# Patient Record
Sex: MALE | Race: OTHER | HISPANIC OR LATINO | Employment: FULL TIME | URBAN - METROPOLITAN AREA
[De-identification: names, ages, dates, MRNs, and addresses within clinical notes are randomized per-mention and may not be internally consistent; named-entity substitution may affect disease eponyms.]

---

## 2018-09-10 ENCOUNTER — TRANSCRIBE ORDERS (OUTPATIENT)
Dept: ADMINISTRATIVE | Facility: HOSPITAL | Age: 43
End: 2018-09-10

## 2018-09-10 ENCOUNTER — APPOINTMENT (OUTPATIENT)
Dept: LAB | Facility: HOSPITAL | Age: 43
End: 2018-09-10

## 2018-09-10 DIAGNOSIS — Z00.8 ENCOUNTER FOR OTHER GENERAL EXAMINATION: Primary | ICD-10-CM

## 2018-09-10 DIAGNOSIS — Z00.8 ENCOUNTER FOR OTHER GENERAL EXAMINATION: ICD-10-CM

## 2018-09-10 LAB
CHOLEST SERPL-MCNC: 207 MG/DL (ref 50–200)
EST. AVERAGE GLUCOSE BLD GHB EST-MCNC: 114 MG/DL
HBA1C MFR BLD: 5.6 % (ref 4.2–6.3)
HDLC SERPL-MCNC: 38 MG/DL (ref 40–60)
LDLC SERPL CALC-MCNC: 131 MG/DL (ref 0–100)
NONHDLC SERPL-MCNC: 169 MG/DL
TRIGL SERPL-MCNC: 192 MG/DL

## 2018-09-10 PROCEDURE — 80061 LIPID PANEL: CPT

## 2018-09-10 PROCEDURE — 83036 HEMOGLOBIN GLYCOSYLATED A1C: CPT

## 2018-09-10 PROCEDURE — 36415 COLL VENOUS BLD VENIPUNCTURE: CPT

## 2018-10-08 RX ORDER — MONTELUKAST SODIUM 10 MG/1
1 TABLET ORAL
COMMUNITY
Start: 2017-10-10 | End: 2018-12-12 | Stop reason: SDUPTHER

## 2018-10-08 RX ORDER — ALBUTEROL SULFATE 90 UG/1
AEROSOL, METERED RESPIRATORY (INHALATION)
COMMUNITY
Start: 2017-06-26 | End: 2018-12-12 | Stop reason: SDUPTHER

## 2018-10-09 ENCOUNTER — OFFICE VISIT (OUTPATIENT)
Dept: FAMILY MEDICINE CLINIC | Facility: CLINIC | Age: 43
End: 2018-10-09
Payer: COMMERCIAL

## 2018-10-09 VITALS
WEIGHT: 235 LBS | TEMPERATURE: 98 F | RESPIRATION RATE: 16 BRPM | SYSTOLIC BLOOD PRESSURE: 138 MMHG | HEART RATE: 64 BPM | BODY MASS INDEX: 31.83 KG/M2 | HEIGHT: 72 IN | DIASTOLIC BLOOD PRESSURE: 96 MMHG | OXYGEN SATURATION: 99 %

## 2018-10-09 DIAGNOSIS — R73.9 HYPERGLYCEMIA: ICD-10-CM

## 2018-10-09 DIAGNOSIS — Z00.00 PE (PHYSICAL EXAM), ANNUAL: Primary | ICD-10-CM

## 2018-10-09 DIAGNOSIS — R00.2 PALPITATIONS: ICD-10-CM

## 2018-10-09 DIAGNOSIS — E78.5 HYPERLIPIDEMIA, UNSPECIFIED HYPERLIPIDEMIA TYPE: ICD-10-CM

## 2018-10-09 DIAGNOSIS — I10 ESSENTIAL HYPERTENSION: ICD-10-CM

## 2018-10-09 PROCEDURE — 99396 PREV VISIT EST AGE 40-64: CPT | Performed by: FAMILY MEDICINE

## 2018-10-09 PROCEDURE — 93000 ELECTROCARDIOGRAM COMPLETE: CPT | Performed by: FAMILY MEDICINE

## 2018-10-09 NOTE — PATIENT INSTRUCTIONS
Wellness Visit for Adults   WHAT YOU NEED TO KNOW:   What is a wellness visit? A wellness visit is when you see your healthcare provider to get screened for health problems  You can also get advice on how to stay healthy  Write down your questions so you remember to ask them  Ask your healthcare provider how often you should have a wellness visit  What happens at a wellness visit? Your healthcare provider will ask about your health, and your family history of health problems  This includes high blood pressure, heart disease, and cancer  He or she will ask if you have symptoms that concern you, if you smoke, and about your mood  You may also be asked about your intake of medicines, supplements, food, and alcohol  Any of the following may be done:  · Your weight  will be checked  Your height may also be checked so your body mass index (BMI) can be calculated  Your BMI shows if you are at a healthy weight  · Your blood pressure  and heart rate will be checked  Your temperature may also be checked  · Blood and urine tests  may be done  Blood tests may be done to check your cholesterol levels  Abnormal cholesterol levels increase your risk for heart disease and stroke  You may also need a blood or urine test to check for diabetes if you are at increased risk  Urine tests may be done to look for signs of an infection or kidney disease  · A physical exam  includes checking your heartbeat and lungs with a stethoscope  Your healthcare provider may also check your skin to look for sun damage  · Screening tests  may be recommended  A screening test is done to check for diseases that may not cause symptoms  The screening tests you may need depend on your age, gender, family history, and lifestyle habits  For example, colorectal screening may be recommended if you are 48years old or older  What screening tests do I need if I am a woman? · A Pap smear  is used to screen for cervical cancer   Pap smears are usually done every 3 to 5 years depending on your age  You may need them more often if you have had abnormal Pap smear test results in the past  Ask your healthcare provider how often you should have a Pap smear  · A mammogram  is an x-ray of your breasts to screen for breast cancer  Experts recommend mammograms every 2 years starting at age 48 years  You may need a mammogram at age 52 years or younger if you have an increased risk for breast cancer  Talk to your healthcare provider about when you should start having mammograms and how often you need them  What vaccines might I need? · Get an influenza vaccine  every year  The influenza vaccine protects you from the flu  Several types of viruses cause the flu  The viruses change over time, so new vaccines are made each year  · Get a tetanus-diphtheria (Td) booster vaccine  every 10 years  This vaccine protects you against tetanus and diphtheria  Tetanus is a severe infection that may cause painful muscle spasms and lockjaw  Diphtheria is a severe bacterial infection that causes a thick covering in the back of your mouth and throat  · Get a human papillomavirus (HPV) vaccine  if you are female and aged 23 to 32 or male 23 to 24 and never received it  This vaccine protects you from HPV infection  HPV is the most common infection spread by sexual contact  HPV may also cause vaginal, penile, and anal cancers  · Get a pneumococcal vaccine  if you are aged 72 years or older  The pneumococcal vaccine is an injection given to protect you from pneumococcal disease  Pneumococcal disease is an infection caused by pneumococcal bacteria  The infection may cause pneumonia, meningitis, or an ear infection  · Get a shingles vaccine  if you are aged 61 or older, even if you have had shingles before  The shingles vaccine is an injection to protect you from the varicella-zoster virus  This is the same virus that causes chickenpox   Shingles is a painful rash that develops in people who had chickenpox or have been exposed to the virus  How can I eat healthy? My Plate is a model for planning healthy meals  It shows the types and amounts of foods that should go on your plate  Fruits and vegetables make up about half of your plate, and grains and protein make up the other half  A serving of dairy is included on the side of your plate  The amount of calories and serving sizes you need depends on your age, gender, weight, and height  Examples of healthy foods are listed below:  · Eat a variety of vegetables  such as dark green, red, and orange vegetables  You can also include canned vegetables low in sodium (salt) and frozen vegetables without added butter or sauces  · Eat a variety of fresh fruits , canned fruit in 100% juice, frozen fruit, and dried fruit  · Include whole grains  At least half of the grains you eat should be whole grains  Examples include whole-wheat bread, wheat pasta, brown rice, and whole-grain cereals such as oatmeal     · Eat a variety of protein foods such as seafood (fish and shellfish), lean meat, and poultry without skin (turkey and chicken)  Examples of lean meats include pork leg, shoulder, or tenderloin, and beef round, sirloin, tenderloin, and extra lean ground beef  Other protein foods include eggs and egg substitutes, beans, peas, soy products, nuts, and seeds  · Choose low-fat dairy products such as skim or 1% milk or low-fat yogurt, cheese, and cottage cheese  · Limit unhealthy fats  such as butter, hard margarine, and shortening  How much exercise do I need? Exercise at least 30 minutes per day on most days of the week  Some examples of exercise include walking, biking, dancing, and swimming  You can also fit in more physical activity by taking the stairs instead of the elevator or parking farther away from stores  Include muscle strengthening activities 2 days each week  Regular exercise provides many health benefits  It helps you manage your weight, and decreases your risk for type 2 diabetes, heart disease, stroke, and high blood pressure  Exercise can also help improve your mood  Ask your healthcare provider about the best exercise plan for you  What are some general health and safety guidelines I should follow? · Do not smoke  Nicotine and other chemicals in cigarettes and cigars can cause lung damage  Ask your healthcare provider for information if you currently smoke and need help to quit  E-cigarettes or smokeless tobacco still contain nicotine  Talk to your healthcare provider before you use these products  · Limit alcohol  A drink of alcohol is 12 ounces of beer, 5 ounces of wine, or 1½ ounces of liquor  · Lose weight, if needed  Being overweight increases your risk of certain health conditions  These include heart disease, high blood pressure, type 2 diabetes, and certain types of cancer  · Protect your skin  Do not sunbathe or use tanning beds  Use sunscreen with a SPF 15 or higher  Apply sunscreen at least 15 minutes before you go outside  Reapply sunscreen every 2 hours  Wear protective clothing, hats, and sunglasses when you are outside  · Drive safely  Always wear your seatbelt  Make sure everyone in your car wears a seatbelt  A seatbelt can save your life if you are in an accident  Do not use your cell phone when you are driving  This could distract you and cause an accident  Pull over if you need to make a call or send a text message  · Practice safe sex  Use latex condoms if are sexually active and have more than one partner  Your healthcare provider may recommend screening tests for sexually transmitted infections (STIs)  · Wear helmets, lifejackets, and protective gear  Always wear a helmet when you ride a bike or motorcycle, go skiing, or play sports that could cause a head injury  Wear protective equipment when you play sports   Wear a lifejacket when you are on a boat or doing water sports  CARE AGREEMENT:   You have the right to help plan your care  Learn about your health condition and how it may be treated  Discuss treatment options with your caregivers to decide what care you want to receive  You always have the right to refuse treatment  The above information is an  only  It is not intended as medical advice for individual conditions or treatments  Talk to your doctor, nurse or pharmacist before following any medical regimen to see if it is safe and effective for you  © 2017 [facility] Information is for End User's use only and may not be sold, redistributed or otherwise used for commercial purposes  All illustrations and images included in CareNotes® are the copyrighted property of A D A M , Inc  or Eric Saldaña

## 2018-10-09 NOTE — PROGRESS NOTES
Assessment/Plan:  1  PE (physical exam), annual  Beside his blood pressure is elevated, and obesity, patient has a number med physical exam today he already had lipid profile and hemoglobin A1c by employer and does not needto be repeated  He has deficiency of HDL, exercising will improve this number and the goal is above 40  No need for any treatment at this time  - POCT ECG  - CBC and differential; Future  - Comprehensive metabolic panel; Future  - TSH, 3rd generation with Free T4 reflex; Future            4  Palpitations  EKG is normal limits and I did find any explanation for palpitations  - TSH, 3rd generation with Free T4 reflex; Future    5  Essential hypertension  His blood pressure elevation possible is related to 1  Increase await 2  Lack of exercise 3  Excessive salt in the diet  All this three points is being addressed by patient at this time, I will follow him up in three month to ensure improvement of his blood pressure, if continue let elevated a pharmacological treatment will be offered  No problem-specific Assessment & Plan notes found for this encounter  Diagnoses and all orders for this visit:    PE (physical exam), annual  -     POCT ECG          Subjective:      Patient ID: Owen Hill is a 38 y o  male  Pt here for annual physical exam         The following portions of the patient's history were reviewed and updated as appropriate: allergies, current medications, past family history, past medical history, past social history, past surgical history and problem list     Review of Systems   Constitutional: Positive for unexpected weight change (He gain weight from last appointment six months ago and is working with exercising, biking )  Negative for activity change, appetite change, fatigue and fever  HENT: Negative for congestion, dental problem, ear pain, sinus pain and trouble swallowing  Eyes: Negative for discharge, redness and itching     Respiratory: Negative for cough, shortness of breath and wheezing  He has a history of asthma but is not complaining of any acute distress   Cardiovascular: Negative for chest pain  His blood pressure was elevated same numbers done and this office, patient understands the lifestyle modification is needed  Gastrointestinal: Negative for abdominal distention and abdominal pain  Genitourinary: Negative for difficulty urinating, dysuria and enuresis  Musculoskeletal: Negative for arthralgias, back pain and gait problem  Neurological: Negative for dizziness and headaches  Hematological: Negative for adenopathy  Does not bruise/bleed easily  Psychiatric/Behavioral: Negative for behavioral problems  Objective:      /96 (BP Location: Left arm, Patient Position: Sitting, Cuff Size: Standard)   Pulse 64   Temp 98 °F (36 7 °C) (Oral)   Resp 16   Ht 5' 11 5" (1 816 m)   Wt 107 kg (235 lb)   SpO2 99%   BMI 32 32 kg/m²          Physical Exam   Constitutional: He is oriented to person, place, and time  Obese with BMI 32   HENT:   Head: Normocephalic  Eyes: Pupils are equal, round, and reactive to light  Neck: Normal range of motion  Cardiovascular: Normal rate, regular rhythm and normal heart sounds  Pulmonary/Chest: Effort normal and breath sounds normal    Abdominal: Soft  Bowel sounds are normal    Genitourinary:   Genitourinary Comments: Patient declines genitalia and prostate exam   Musculoskeletal: Normal range of motion  Neurological: He is alert and oriented to person, place, and time  He has normal reflexes  Skin: Skin is warm and dry  Psychiatric: He has a normal mood and affect   His behavior is normal  Judgment and thought content normal

## 2018-12-08 ENCOUNTER — APPOINTMENT (OUTPATIENT)
Dept: LAB | Facility: HOSPITAL | Age: 43
End: 2018-12-08
Payer: COMMERCIAL

## 2018-12-08 ENCOUNTER — TRANSCRIBE ORDERS (OUTPATIENT)
Dept: ADMINISTRATIVE | Facility: HOSPITAL | Age: 43
End: 2018-12-08

## 2018-12-08 DIAGNOSIS — Z00.00 PE (PHYSICAL EXAM), ANNUAL: ICD-10-CM

## 2018-12-08 LAB
ALBUMIN SERPL BCP-MCNC: 4.3 G/DL (ref 3–5.2)
ALP SERPL-CCNC: 101 U/L (ref 43–122)
ALT SERPL W P-5'-P-CCNC: 48 U/L (ref 9–52)
ANION GAP SERPL CALCULATED.3IONS-SCNC: 8 MMOL/L (ref 5–14)
AST SERPL W P-5'-P-CCNC: 35 U/L (ref 17–59)
BILIRUB SERPL-MCNC: 0.8 MG/DL
BUN SERPL-MCNC: 18 MG/DL (ref 5–25)
CALCIUM SERPL-MCNC: 8.6 MG/DL (ref 8.4–10.2)
CHLORIDE SERPL-SCNC: 104 MMOL/L (ref 97–108)
CO2 SERPL-SCNC: 29 MMOL/L (ref 22–30)
CREAT SERPL-MCNC: 0.87 MG/DL (ref 0.7–1.5)
GFR SERPL CREATININE-BSD FRML MDRD: 105 ML/MIN/1.73SQ M
GLUCOSE P FAST SERPL-MCNC: 88 MG/DL (ref 70–99)
POTASSIUM SERPL-SCNC: 4 MMOL/L (ref 3.6–5)
PROT SERPL-MCNC: 8 G/DL (ref 5.9–8.4)
SODIUM SERPL-SCNC: 141 MMOL/L (ref 137–147)

## 2018-12-08 PROCEDURE — 36415 COLL VENOUS BLD VENIPUNCTURE: CPT

## 2018-12-08 PROCEDURE — 80053 COMPREHEN METABOLIC PANEL: CPT

## 2018-12-10 DIAGNOSIS — K21.9 GASTROESOPHAGEAL REFLUX DISEASE WITHOUT ESOPHAGITIS: Primary | ICD-10-CM

## 2018-12-10 RX ORDER — MONTELUKAST SODIUM 10 MG/1
10 TABLET ORAL
Qty: 90 TABLET | Refills: 3 | Status: CANCELLED | OUTPATIENT
Start: 2018-12-10

## 2018-12-12 DIAGNOSIS — J45.909 ASTHMA, UNSPECIFIED ASTHMA SEVERITY, UNSPECIFIED WHETHER COMPLICATED, UNSPECIFIED WHETHER PERSISTENT: Primary | ICD-10-CM

## 2018-12-12 RX ORDER — MONTELUKAST SODIUM 10 MG/1
10 TABLET ORAL
Qty: 30 TABLET | Refills: 5 | Status: SHIPPED | OUTPATIENT
Start: 2018-12-12 | End: 2019-02-18 | Stop reason: SDUPTHER

## 2018-12-12 RX ORDER — ALBUTEROL SULFATE 90 UG/1
2 AEROSOL, METERED RESPIRATORY (INHALATION) EVERY 4 HOURS PRN
Qty: 1 INHALER | Refills: 5 | Status: SHIPPED | OUTPATIENT
Start: 2018-12-12 | End: 2020-01-14 | Stop reason: ALTCHOICE

## 2018-12-14 RX ORDER — ESOMEPRAZOLE MAGNESIUM 40 MG/1
40 CAPSULE, DELAYED RELEASE ORAL DAILY
Qty: 90 CAPSULE | Refills: 3 | Status: SHIPPED | OUTPATIENT
Start: 2018-12-14 | End: 2020-03-19 | Stop reason: SDUPTHER

## 2019-02-05 ENCOUNTER — OFFICE VISIT (OUTPATIENT)
Dept: FAMILY MEDICINE CLINIC | Facility: CLINIC | Age: 44
End: 2019-02-05
Payer: COMMERCIAL

## 2019-02-05 VITALS
WEIGHT: 236 LBS | SYSTOLIC BLOOD PRESSURE: 150 MMHG | OXYGEN SATURATION: 98 % | HEIGHT: 72 IN | DIASTOLIC BLOOD PRESSURE: 100 MMHG | RESPIRATION RATE: 16 BRPM | TEMPERATURE: 98.1 F | BODY MASS INDEX: 31.97 KG/M2 | HEART RATE: 78 BPM

## 2019-02-05 DIAGNOSIS — Z23 NEED FOR TDAP VACCINATION: ICD-10-CM

## 2019-02-05 DIAGNOSIS — I10 ESSENTIAL HYPERTENSION: Primary | ICD-10-CM

## 2019-02-05 PROCEDURE — 3008F BODY MASS INDEX DOCD: CPT | Performed by: FAMILY MEDICINE

## 2019-02-05 PROCEDURE — 90715 TDAP VACCINE 7 YRS/> IM: CPT | Performed by: FAMILY MEDICINE

## 2019-02-05 PROCEDURE — 99214 OFFICE O/P EST MOD 30 MIN: CPT | Performed by: FAMILY MEDICINE

## 2019-02-05 PROCEDURE — 90471 IMMUNIZATION ADMIN: CPT | Performed by: FAMILY MEDICINE

## 2019-02-05 NOTE — PATIENT INSTRUCTIONS
Por favor daria con detenimiento  Muy Importante!!!    · Greer peso  será revisado  Es posible que [employer] midan greer altura para calcular greer índice de masa corporal [facility])  El [facility] indica si tiene un peso saludable  · Se verificarán greer presión arterial  y frecuencia cardíaca  También pueden revisar greer temperatura  · Exámenes de [place] [facility]  se podría realizar  Se podrían realizar exámenes de jose para revisar los niveles de [facility]  Los niveles anormales de colesterol aumentan el riesgo de enfermedad del corazón y accidente cerebrovascular  Puede que también necesite susannah prueba de jose u orina para revisar si tiene diabetes si usted está en mayor riesgo  Las pruebas de orina pueden hacerse para buscar signos de susannah infección o susannah enfermedad renal      · Un examen físico  incluye la comprobación de james latidos del corazón y los pulmones con un estetoscopio  Greer médico también podría revisarle la piel para buscar daños causados por el sol  · Pruebas de detección  podría recomendarse  Se realiza un examen de detección para detectar enfermedades que pueden no causar síntomas  Los exámenes de detección que necesite dependen de greer edad, género, antecedentes familiares y hábitos de titus  Por ejemplo, podrían recomendarle la exploración selectiva colorrectal si tiene 48 años o más  ¿Qué exámenes de detección necesito si soy susannah mago? · El examen de Papanicolaou  se utiliza para detectar cáncer de zoraida uterino  El examen del Papanicolaou por lo general se realiza entre 3 a 5 años dependiendo de greer edad  Puede necesitarlo más a menudo si usted ha tenido [employer] de la prueba de Papanicolaou en el pasado  Pregunte a greer médico con qué frecuencia debería realizarse un examen de Papanicolaou  · Susannah mamografía  es susannah radiografía de james senos para detectar cáncer de mama  Los expertos recomiendan [address] cada 2 años empezando a los 48 años de [place]   Es probable que usted necesite susannah mamografía a los 52 años o antes si tiene riesgo alto de cáncer de seno  Hable con greer médico sobre cuándo debe empezar con james mamografías y con cuánta frecuencia las necesita  ¿Qué vacunas podría necesitar? · Debe recibir [place] vacuna contra la influenza  todos los [place] cr[place]  La vacuna contra la influenza protege de la gripe  Varios tipos de virus causan la influenza  Debido a que los virus [place] con el [place], es necesaria la producción de nuevas vacunas cada año  · Debe recibir [place] vacuna de refuerzo contra el tétanos-difteria (Td)  cada 10 años  Esta vacuna protege contra el tétanos y la difteria  El tétanos es susannah infección severa que puede causar trismo y espasmos musculares dolorosos  La difteria es susannah infección bacterial grave que produce susannah cubierta gruesa en la parte de atrás de la boca y garganta  · Debe recibir la vacuna contra el virus del papiloma humano (VPH)  si usted es mago y De Lancey 19 y 32 años o es hombre y De Lancey 23 y 24 años y nunca la recibió  Esta vacuna protege contra la infección por VPH  El virus del papiloma humano o VPH es la infección más común que se transmite por contacto sexual  El virus del papiloma humano también podría provocar cáncer vaginal, del pene y del ano  · Debe recibir la vacuna antineumocócica  si tiene más de 72 años  La vacuna antineumocócica es susannah inyección que se administra para protegerlo contra susannah enfermedad neumocócica  La enfermedad neumocócica es susannah infección causada por la bacteria neumocócica  La infección puede causar neumonía, meningitis o susannah infección del oído  · Debe recibir la vacuna contra la culebrilla  si tiene [address] [place] [address],[address] [place] [address] o [place], incluso si mattson tenido culebrilla antes  La vacuna contra la culebrilla (herpes zóster) es susannah inyección usada para proteger contra el virus zóster que causa la varicela  [place] es el mismo virus que causa la varicela   La culebrilla es un sarpullido doloroso que se desarrolla en personas que tuvieron varicela o mattson estado expuestas al virus  ¿Cómo puedo comer de manera saludable? Mi [place] es un modelo para planear comidas sanas  Muestra los tipos y cantidades de alimentos que deberían ir en un plato  Donn Rubinstein y verduras representan alrededor de la mitad de greer plato y los granos y proteínas representan la otra mitad  Se incluye susannah porción de productos lácteos al lado del plato  La cantidad de calorías y [address] de las porciones que usted necesita dependen de greer edad, [place], peso y altura  Los ejemplos de alimentos saludables son:  · Consuma susannah variedad de verduras  bridget las de color cora oscuro, jamil y [place]  Usted también puede incluir verduras enlatadas bajas en sodio (sal) y verduras congeladas sin mantequilla ni salsas [id number]  · Consuma susannah variedad de fruta frescas , las frutas enlatadas en 100% de jugo , fruta [place] y gloria secos  · Incluya granos enteros  Por lo menos la mitad de los granos que usted consume deben ser granos de michael integral  Por ejemplo, panes de grano entero, pasta integral, arroz integral y cereales de grano entero bridget la kolton  · Consuma susannah variedad de alimentos con proteínas bridget mariscos (pescado y crustáceos), Johns Libra y carne de ave sin piel (pavo y pati)  Ejemplos de dash magras incluyen pierna, paleta o lomo de puerco y sree, solomillo o, lomo de res y carne [place] de res extra [place]  Otros alimentos ricos en proteínas son los huevos y sustitutos de [place], frijoles, chícharos, productos de soya, nueces y semillas  · Elija productos lácteos bajos en grasa [employer] o del 1% o yogur, queso y requesón bajos en grasa  · Limite las grasas poco saludables,  bridget la [place], la margarina dura y la [place]  ¿Qué cantidad de actividad física necesito? Realice susannah actividad física por lo menos 30 minutos al día, la mayoría de los días de la [place]   Algunos de los ejercicios incluyen caminar, montar en bicicleta, bailar y nadar  Usted también puede realizar más actividad física usando las escaleras en vez de los ascensores o estacionarse más lejos cuando Bobie Bolus a las tiendas  Incluya ejercicios para fortalecer los músculos 2 días a la semana  El ejercicio regular proporciona muchos beneficios para la nicole  Olivia Vickers a controlar greer peso y [employer] riesgo de diabetes tipo 2, presión arterial ирина, enfermedad del corazón y accidente cerebrovascular  El ejercicio [employer] puede ayudar a mejorar greer estado de ánimo  Pregunte a greer médico acerca del mejor plan de ejercicio para usted  ¿Cuáles son Celine Brooking generales de nicole y seguridad que alice seguir? · No fume  La nicotina y otras sustancias químicas que contienen los cigarrillos y cigarros pueden dañar los pulmones  Pida información a greer médico si usted actualmente fuma y necesita ayuda para dejar de fumar  Los cigarrillos electrónicos o tabaco sin humo todavía contienen nicotina  Consulte con greer médico antes de [employer]  · Limite el consumo de alcohol  Un trago equivale a 12 onzas de cerveza, 5 onzas de vino o 1 onza y ½ de licor  · Baje de peso, si es necesario  El sobrepeso aumenta el riesgo de ciertas condiciones de [place]  Estos incluyen enfermedad del corazón, presión arterial ирина, diabetes tipo 2 y ciertos tipos de cáncer  · Proteja greer piel  No tome el sol ni use hi de bronceado  Use protector solar con un SPF 13 o mayor  Aplíquese el bloqueador por lo menos 15 minutos antes de que vaya a estar al [employer]  Vuelva a aplicarse la crema solar cada 2 horas  Use ropa protectora, sombrero y lentes para el sol cuando se encuentre afuera  · Conduzca con seguridad  Use siempre greer cinturón de seguridad  Asegúrese que todos en el nicole usan el cinturón de seguridad  Un cinturón de seguridad puede salvar greer titus en latonia de un accidente automovilístico  No use el celular cuando esté manejando   [place] puede hacer que se distraiga y causar un accidente  Es mejor que pare y se orille si necesita hacer susannah Lita Banco un texto  · Practique el sexo seguro  Use condones de látex si es sexualmente [place] y tiene más de [place]  León médico puede recomendar exámenes de detección de infecciones de transmisión sexual (ITS)  · Use un inez, un chaleco salvavidas y unos implementos de protección  Siempre use un inez al [employer] en bicicleta o motocicleta, esquiar o jugar deportes que podrían causar susannah lesión en la jeni  Use implementos de protección cuando practique deportes  Use un chaleco salvavidas cuando esté en un bote o practicando actividades acuáticas

## 2019-02-05 NOTE — PROGRESS NOTES
Assessment/Plan:  1  Need for Tdap vaccination     - TDAP VACCINE GREATER THAN OR EQUAL TO 6YO IM    2  Essential hypertension   A 1-month trial is recommended in compliant patients willing to make therapeutic lifestyle changes, prior to determining that pharmacologic therapy is necessary  Most patients will require drug therapy to achieve target BP control  Again  patient got  explanation of the risks associated with HTN and the need for adequate control and adherence to therapy  Initial therapeutic measure is  lifelong lifestyle modification including:   Sodium reduction (<2 g/day)   Dietary Approaches to Stop Hypertension (DASH) diet (3 servings of fruit and vegetables daily, whole grains, low sodium, low-fat proteins)    Weight loss to BMI under 30 kg/m^2   Increased physical activity: 3 to 5 times/week of daily aerobic exercise for 30- to 50-minute sessions as tolerated    Limited alcohol consumption less than 5 drinks for week  No problem-specific Assessment & Plan notes found for this encounter  Diagnoses and all orders for this visit:    Need for Tdap vaccination  -     TDAP VACCINE GREATER THAN OR EQUAL TO 6YO IM          Subjective:      Patient ID: Adriane Wilkins is a 37 y o  male  HPI    The following portions of the patient's history were reviewed and updated as appropriate: allergies, current medications, past family history, past medical history, past social history, past surgical history and problem list     Review of Systems   Constitutional: Negative for activity change, appetite change, fatigue and fever  HENT: Negative for congestion, dental problem, ear pain, sinus pain and trouble swallowing  Eyes: Negative for discharge, redness and itching  Respiratory: Negative for cough, shortness of breath and wheezing  Cardiovascular: Negative for chest pain  Gastrointestinal: Negative for abdominal distention and abdominal pain     Genitourinary: Negative for difficulty urinating, dysuria and enuresis  Musculoskeletal: Negative for arthralgias, back pain and gait problem  Neurological: Negative for dizziness and headaches  Hematological: Negative for adenopathy  Does not bruise/bleed easily  Psychiatric/Behavioral: Negative for behavioral problems  Objective:      /100 (BP Location: Left arm, Patient Position: Sitting, Cuff Size: Standard)   Pulse 78   Temp 98 1 °F (36 7 °C) (Oral)   Resp 16   Ht 5' 11 5" (1 816 m)   Wt 107 kg (236 lb)   SpO2 98%   BMI 32 46 kg/m²          Physical Exam   Constitutional: He is oriented to person, place, and time  He appears well-developed  HENT:   Head: Normocephalic  Eyes: Pupils are equal, round, and reactive to light  Neck: Normal range of motion  Cardiovascular: Normal rate  Pulmonary/Chest: Effort normal and breath sounds normal    Abdominal: Soft  Genitourinary: Penis normal    Musculoskeletal: Normal range of motion  Neurological: He is alert and oriented to person, place, and time  Skin: Skin is warm and dry  Psychiatric: He has a normal mood and affect   His behavior is normal  Judgment and thought content normal

## 2019-02-18 DIAGNOSIS — J45.909 ASTHMA, UNSPECIFIED ASTHMA SEVERITY, UNSPECIFIED WHETHER COMPLICATED, UNSPECIFIED WHETHER PERSISTENT: ICD-10-CM

## 2019-02-18 RX ORDER — MONTELUKAST SODIUM 10 MG/1
10 TABLET ORAL
Qty: 90 TABLET | Refills: 3 | Status: SHIPPED | OUTPATIENT
Start: 2019-02-18 | End: 2020-03-19 | Stop reason: SDUPTHER

## 2019-03-28 ENCOUNTER — OFFICE VISIT (OUTPATIENT)
Dept: FAMILY MEDICINE CLINIC | Facility: CLINIC | Age: 44
End: 2019-03-28
Payer: COMMERCIAL

## 2019-03-28 VITALS
HEIGHT: 73 IN | BODY MASS INDEX: 31.17 KG/M2 | OXYGEN SATURATION: 98 % | RESPIRATION RATE: 20 BRPM | SYSTOLIC BLOOD PRESSURE: 130 MMHG | DIASTOLIC BLOOD PRESSURE: 90 MMHG | HEART RATE: 79 BPM | TEMPERATURE: 97.1 F | WEIGHT: 235.2 LBS

## 2019-03-28 DIAGNOSIS — I10 ESSENTIAL HYPERTENSION: Primary | ICD-10-CM

## 2019-03-28 DIAGNOSIS — E66.9 OBESITY (BMI 30.0-34.9): ICD-10-CM

## 2019-03-28 PROBLEM — E66.811 OBESITY (BMI 30.0-34.9): Status: ACTIVE | Noted: 2019-03-28

## 2019-03-28 PROCEDURE — 99213 OFFICE O/P EST LOW 20 MIN: CPT | Performed by: FAMILY MEDICINE

## 2019-03-28 PROCEDURE — 3008F BODY MASS INDEX DOCD: CPT | Performed by: FAMILY MEDICINE

## 2019-03-28 PROCEDURE — 1036F TOBACCO NON-USER: CPT | Performed by: FAMILY MEDICINE

## 2019-03-28 NOTE — ASSESSMENT & PLAN NOTE
Decreasing carbs in the diet and exercising more intense will help him to get on the target weight  For the next three months the goal is decrease the 5% of his body weight, which is approximately 12 lb

## 2019-03-28 NOTE — PROGRESS NOTES
Assessment/Plan:    Essential hypertension  He initiated lifestyle modification, decreasing the salt in diet  He also exercise and check his steps frequently  We will need medications at this time  We need to continue doing same but increase exercise to a moderate intense to have a target heart rate 60% for age  To reduce 12 lb of his weight for the next appointment     Recheck in three month  Obesity (BMI 30 0-34  9)  Decreasing carbs in the diet and exercising more intense will help him to get on the target weight  For the next three months the goal is decrease the 5% of his body weight, which is approximately 12 lb  Diagnoses and all orders for this visit:    Essential hypertension    Obesity (BMI 30 0-34  9)          Subjective:      Patient ID: Jose Luis Snow is a 37 y o  male  Patient is here to follow HTN, patient states good compliance with treatment  Denies chest pain, shortness of breath, angina, urinary problems  No exercise but follows low salt diet  The following portions of the patient's history were reviewed and updated as appropriate: allergies, current medications, past family history, past medical history, past social history, past surgical history and problem list     Review of Systems   Constitutional: Negative for activity change, appetite change, fatigue and fever  HENT: Negative for congestion, dental problem, ear pain, sinus pain and trouble swallowing  Eyes: Negative for discharge, redness and itching  Respiratory: Negative for cough, shortness of breath and wheezing  Cardiovascular: Negative for chest pain  Gastrointestinal: Negative for abdominal distention and abdominal pain  Genitourinary: Negative for difficulty urinating, dysuria and enuresis  Musculoskeletal: Negative for arthralgias, back pain and gait problem  Neurological: Negative for dizziness and headaches  Hematological: Negative for adenopathy  Does not bruise/bleed easily  Psychiatric/Behavioral: Negative for behavioral problems  Objective:      /90   Pulse 79   Temp (!) 97 1 °F (36 2 °C) (Temporal)   Resp 20   Ht 6' 1" (1 854 m)   Wt 107 kg (235 lb 3 2 oz)   SpO2 98%   BMI 31 03 kg/m²          Physical Exam   Constitutional: He is oriented to person, place, and time  He appears well-developed  HENT:   Head: Normocephalic  Eyes: Pupils are equal, round, and reactive to light  Neck: Normal range of motion  Cardiovascular: Normal rate  Pulmonary/Chest: Effort normal and breath sounds normal    Abdominal: Soft  Genitourinary: Penis normal    Musculoskeletal: Normal range of motion  Neurological: He is alert and oriented to person, place, and time  Skin: Skin is warm and dry  Psychiatric: He has a normal mood and affect  His behavior is normal  Judgment and thought content normal    Nursing note and vitals reviewed  BMI Counseling: Body mass index is 31 03 kg/m²  Discussed the patient's BMI with him  The BMI is above average  BMI counseling and education was provided to the patient  Exercise recommendations include exercising 3-5 times per week

## 2019-03-28 NOTE — PATIENT INSTRUCTIONS
Core Strengthening Exercises   AMBULATORY CARE:   What you need to know about core strengthening exercises: Your core includes the muscles of your lower back, hip, pelvis, and abdomen  Core strengthening exercises help heal and strengthen these muscles  This helps prevent another injury, and keeps your pelvis, spine, and hips in the correct position  Contact your healthcare provider if:   · You have sharp or worsening pain during exercise or at rest     · You have questions or concerns about your shoulder exercises  Safety tips:  Talk to your healthcare provider before you start an exercise program  A physical therapist can teach you how to do core strengthening exercises safely  · Do the exercises on a mat or firm surface  A firm surface will support your spine and avoid low back pain  Do not do these exercises on a bed  · Move slowly and smoothly  Avoid fast or jerky motions  · Stop if you feel pain  Core exercises should not feel painful  Stop if you feel pain  · Breathe normally during core exercises  Do not hold your breath  This may cause an increase in blood pressure and prevent muscle strengthening  Your healthcare provider will tell you when to inhale and exhale during the exercise  · Begin all of your exercises with abdominal bracing  Abdominal bracing helps warm up your core muscles  You can also practice abdominal bracing throughout the day while you are sitting or standing  Lie on your back with your knees bent and feet flat on the floor  Place your arms in a relaxed position beside your body  Tighten your abdominal muscles  Pull your belly button in and up toward your spine  Hold for 5 seconds  Relax your muscles  Repeat 10 times  Core strengthening exercises: Your healthcare provider will tell you how often to do these exercises  The provider will also tell you how many repetitions of each exercise you should do  Hold each exercise for 5 seconds or as directed   As you get stronger, increase your hold to 10 to 15 seconds  You can do some of these exercises on a stability ball, or with a weight  Ask your healthcare provider how to use a stability ball or weight for these exercises:  · Bent leg side bridge:  Lie on one side with your legs, hips, and shoulders in a straight line  Prop yourself up onto your forearm so your elbow is directly below your shoulder  Bend your knees to 90°  Lift your hips off the floor  Balance yourself on your forearm and the side of your knee  Hold this position  Repeat on the other side  · Straight leg side bridge:  Lie on one side with your legs, hips, and shoulders in a straight line  Prop yourself up onto your forearm so your elbow is directly below your shoulder  Your top leg can rest in front of your bottom leg for support  Lift your hips off the floor  Balance yourself on your forearm and the outside of your flexed foot  Do not let your ankle bend sideways  Hold this position  Repeat on the other side  · Superman:  Lie on your stomach  Extend your arms forward on the floor  Tighten your abdominal muscles and lift your right hand and left leg off the floor  Hold this position  Slowly return to the starting position  Tighten your abdominal muscles and lift your left hand and right leg off the floor  Hold this position  Slowly return to the starting position  · Curl up:  Lie on your back with your knees bent and feet flat on the floor  Place your hands, palms down, underneath your lower back  Next, with your elbows on the floor, lift your shoulders and chest 2 to 3 inches off the floor  Keep your head in line with your shoulders  Hold this position  Slowly return to the starting position  · Straight leg raises:  Lie on your back with one leg straight  Bend the other knee and place your foot flat on the floor  Tighten your abdominal muscles   Keep your leg straight and slowly lift it straight up 6 to 12 inches off the floor  Hold this position  Lower your leg slowly  Do as many repetitions as directed on this side  Repeat with the other leg  · Plank:  Lie on your stomach  Bend your elbows and place your forearms flat on the floor  Lift your chest, stomach, and knees off the floor  Make sure your elbows are below your shoulders  Your body should be in a straight line  Do not let your hips or lower back sink to the ground  Squeeze your abdominal muscles together and hold for 15 seconds  To make this exercise harder, hold for 30 seconds or lift 1 leg at a time  · Bicycles:  Lie on your back  Bend both knees and bring them toward your chest  Your calves should be parallel to the floor  Place the palms of your hands on the back of your head  Straighten your right leg and keep it lifted 2 inches off the floor  Raise your head and shoulders off the floor and twist towards your left  Keep your head and shoulders lifted  Bend your right knee while you straighten your left leg  Keep your left leg 2 inches off the floor  Twist your head and chest towards the left leg  Continue to straighten 1 leg at a time and twist        Follow up with your healthcare provider as directed:  Write down your questions so you remember to ask them during your visits  © 2017 [employer] Information is for End User's use only and may not be sold, redistributed or otherwise used for commercial purposes  All illustrations and images included in CareNotes® are the copyrighted property of [employer]  or Eric Saldaña  The above information is an  only  It is not intended as medical advice for individual conditions or treatments  Talk to your doctor, nurse or pharmacist before following any medical regimen to see if it is safe and effective for you

## 2019-03-28 NOTE — ASSESSMENT & PLAN NOTE
He initiated lifestyle modification, decreasing the salt in diet  He also exercise and check his steps frequently  We will need medications at this time  We need to continue doing same but increase exercise to a moderate intense to have a target heart rate 60% for age  To reduce 12 lb of his weight for the next appointment     Recheck in three month

## 2019-07-02 ENCOUNTER — OFFICE VISIT (OUTPATIENT)
Dept: FAMILY MEDICINE CLINIC | Facility: CLINIC | Age: 44
End: 2019-07-02
Payer: COMMERCIAL

## 2019-07-02 VITALS
RESPIRATION RATE: 16 BRPM | DIASTOLIC BLOOD PRESSURE: 80 MMHG | HEART RATE: 78 BPM | TEMPERATURE: 98 F | BODY MASS INDEX: 31.02 KG/M2 | HEIGHT: 72 IN | OXYGEN SATURATION: 98 % | SYSTOLIC BLOOD PRESSURE: 130 MMHG | WEIGHT: 229 LBS

## 2019-07-02 DIAGNOSIS — R73.9 HYPERGLYCEMIA: ICD-10-CM

## 2019-07-02 DIAGNOSIS — I10 ESSENTIAL HYPERTENSION: Primary | ICD-10-CM

## 2019-07-02 PROCEDURE — 1036F TOBACCO NON-USER: CPT | Performed by: FAMILY MEDICINE

## 2019-07-02 PROCEDURE — 3008F BODY MASS INDEX DOCD: CPT | Performed by: FAMILY MEDICINE

## 2019-07-02 PROCEDURE — 99213 OFFICE O/P EST LOW 20 MIN: CPT | Performed by: FAMILY MEDICINE

## 2019-07-02 NOTE — PROGRESS NOTES
Assessment/Plan:    Essential hypertension  Patient will continue with previous regimen  I explained to patient the risks associated with HTN and the need for adequate control and adherence to therapy  The continue therapeutic measure is  lifelong lifestyle modification including:   Sodium reduction (<2 g/day)   Dietary Approaches to Stop Hypertension (DASH) diet (3 servings of fruit and vegetables daily, whole grains, low sodium, low-fat proteins)    Weight loss to BMI under 30 kg/m^2   Increased physical activity: 3 to 5 times/week of daily aerobic exercise for 30- to 50-minute sessions as tolerated    Avoid alcohol consumption  Smoking assessment  was done  Patient is a  non-smoker  Diagnoses and all orders for this visit:    Essential hypertension  -     Comprehensive metabolic panel; Future  -     Lipid panel; Future    Hyperglycemia  -     Hemoglobin A1C; Future        BMI Counseling: Body mass index is 31 49 kg/m²  Discussed the patient's BMI with him  The BMI is above average  BMI counseling and education was provided to the patient  Exercise recommendations include exercising 3-5 times per week  Subjective:      Patient ID: Dalton Sauer is a 37 y o  male  HPI    The following portions of the patient's history were reviewed and updated as appropriate: allergies, current medications, past family history, past medical history, past social history, past surgical history and problem list     Review of Systems   Constitutional: Negative for chills, diaphoresis, fatigue and fever  HENT: Negative for hearing loss, sinus pressure, sore throat and trouble swallowing  Eyes: Negative for photophobia, pain, redness and visual disturbance  Respiratory: Negative for cough, choking, chest tightness and shortness of breath  Cardiovascular: Negative for chest pain, palpitations and leg swelling  Gastrointestinal: Negative for abdominal pain     Genitourinary: Negative for difficulty urinating, dysuria, enuresis and flank pain  Musculoskeletal: Negative for arthralgias, back pain, gait problem and joint swelling  Neurological: Negative for dizziness, facial asymmetry, light-headedness and headaches  Psychiatric/Behavioral: Negative for agitation, behavioral problems, confusion and decreased concentration  Objective:      /80 (BP Location: Left arm, Patient Position: Sitting, Cuff Size: Standard)   Pulse 78   Temp 98 °F (36 7 °C) (Oral)   Resp 16   Ht 5' 11 5" (1 816 m)   Wt 104 kg (229 lb)   SpO2 98%   BMI 31 49 kg/m²          Physical Exam   Constitutional: No distress  HENT:   Nose: Nose normal    Mouth/Throat: Oropharynx is clear and moist    Eyes: Pupils are equal, round, and reactive to light  Conjunctivae are normal    Neck: Normal range of motion  No thyromegaly present  Cardiovascular: Normal rate, regular rhythm and normal heart sounds  Exam reveals no friction rub  No murmur heard  Pulmonary/Chest: Effort normal and breath sounds normal  No stridor  No respiratory distress  Musculoskeletal: He exhibits no edema, tenderness or deformity  Neurological: He displays normal reflexes  No cranial nerve deficit  He exhibits normal muscle tone  Coordination normal    Skin: He is not diaphoretic

## 2019-07-02 NOTE — ASSESSMENT & PLAN NOTE
Patient will continue with previous regimen  I explained to patient the risks associated with HTN and the need for adequate control and adherence to therapy  The continue therapeutic measure is  lifelong lifestyle modification including:   Sodium reduction (<2 g/day)   Dietary Approaches to Stop Hypertension (DASH) diet (3 servings of fruit and vegetables daily, whole grains, low sodium, low-fat proteins)    Weight loss to BMI under 30 kg/m^2   Increased physical activity: 3 to 5 times/week of daily aerobic exercise for 30- to 50-minute sessions as tolerated    Avoid alcohol consumption  Smoking assessment  was done  Patient is a  non-smoker

## 2019-11-20 ENCOUNTER — OFFICE VISIT (OUTPATIENT)
Dept: FAMILY MEDICINE CLINIC | Facility: CLINIC | Age: 44
End: 2019-11-20
Payer: COMMERCIAL

## 2019-11-20 VITALS
WEIGHT: 229 LBS | SYSTOLIC BLOOD PRESSURE: 130 MMHG | RESPIRATION RATE: 16 BRPM | HEIGHT: 72 IN | TEMPERATURE: 98 F | OXYGEN SATURATION: 97 % | BODY MASS INDEX: 31.02 KG/M2 | HEART RATE: 84 BPM | DIASTOLIC BLOOD PRESSURE: 86 MMHG

## 2019-11-20 DIAGNOSIS — E66.9 OBESITY (BMI 30.0-34.9): ICD-10-CM

## 2019-11-20 DIAGNOSIS — I10 ESSENTIAL HYPERTENSION: ICD-10-CM

## 2019-11-20 DIAGNOSIS — B35.3 TINEA PEDIS OF RIGHT FOOT: ICD-10-CM

## 2019-11-20 DIAGNOSIS — Z00.01 ENCOUNTER FOR GENERAL ADULT MEDICAL EXAMINATION WITH ABNORMAL FINDINGS: ICD-10-CM

## 2019-11-20 DIAGNOSIS — Z23 NEED FOR PNEUMOCOCCAL VACCINE: Primary | ICD-10-CM

## 2019-11-20 DIAGNOSIS — Z11.4 SCREENING FOR HUMAN IMMUNODEFICIENCY VIRUS: ICD-10-CM

## 2019-11-20 PROCEDURE — 99396 PREV VISIT EST AGE 40-64: CPT | Performed by: FAMILY MEDICINE

## 2019-11-20 PROCEDURE — 90471 IMMUNIZATION ADMIN: CPT | Performed by: FAMILY MEDICINE

## 2019-11-20 PROCEDURE — 90732 PPSV23 VACC 2 YRS+ SUBQ/IM: CPT | Performed by: FAMILY MEDICINE

## 2019-11-20 RX ORDER — CLOTRIMAZOLE AND BETAMETHASONE DIPROPIONATE 10; .64 MG/G; MG/G
CREAM TOPICAL
Qty: 30 G | Refills: 1 | Status: SHIPPED | OUTPATIENT
Start: 2019-11-20 | End: 2020-01-09 | Stop reason: ALTCHOICE

## 2019-11-20 NOTE — ASSESSMENT & PLAN NOTE
During this visit we have a goal to personalize prevention  I discussed the patient about Excercise, the need for a life style plan and decrease the impact of current problems  Health risk assessment was discussed with patient also and the ways to stay healthier  We reviewed also the current medications, the need to avoid polypharmacy in his current treatment; also about how the chronic conditions are impacting now and later  Recommended a healthy diet and exercising frequently will help to control better patient's current chronic conditions;  Immunizations, and the need to compliance with current CDC's recommendations  Patient declined at this time advanced directives

## 2019-11-20 NOTE — PROGRESS NOTES
Subjective:   Brennan Villarreal is a 37 y o  male with hypertension  Current Outpatient Medications   Medication Sig Dispense Refill    albuterol (VENTOLIN HFA) 90 mcg/act inhaler Inhale 2 puffs every 4 (four) hours as needed for wheezing 1 Inhaler 5    esomeprazole (NexIUM) 40 MG capsule Take 1 capsule (40 mg total) by mouth daily 90 capsule 3    montelukast (SINGULAIR) 10 mg tablet Take 1 tablet (10 mg total) by mouth daily at bedtime 90 tablet 3    clotrimazole-betamethasone (LOTRISONE) 1-0 05 % cream Apply topical over affected areas twice a day 30 g 1     No current facility-administered medications for this visit  HPI: for PE  HTN non complaints  Hypertension ROS: no medication side effects noted, home BP monitoring in range of 275'M systolic over 41'T diastolic, no TIA's, no chest pain on exertion, no dyspnea on exertion, New York Heart Association (NYHA) class I, no swelling of ankles and no palpitations  New concerns: none  Objective:   /86 (BP Location: Left arm, Patient Position: Sitting, Cuff Size: Standard)   Pulse 84   Temp 98 °F (36 7 °C) (Oral)   Resp 16   Ht 5' 11 5" (1 816 m)   Wt 104 kg (229 lb)   SpO2 97%   BMI 31 49 kg/m²     /86 (BP Location: Left arm, Patient Position: Sitting, Cuff Size: Standard)   Pulse 84   Temp 98 °F (36 7 °C) (Oral)   Resp 16   Ht 5' 11 5" (1 816 m)   Wt 104 kg (229 lb)   SpO2 97%   BMI 31 49 kg/m²    Appearance alert, well appearing, and in no distress and overweight  General exam BP noted to be well controlled today in office, BP noted to be borderline elevated today in office, S1, S2 normal, no gallop, no murmur, chest clear, no JVD, no HSM, no edema  Lab review: labs are reviewed, up to date and normal      Assessment:    Hypertension improved, borderline controlled, needs improvement and needs to follow diet more regularly  Plan:   Current treatment plan is effective, no change in therapy    Recommended sodium restriction  Reviewed medications and side effects in detail     1  Encounter for general adult medical examination with abnormal findings    2  Screening for human immunodeficiency virus  - HIV 1/2 AG-AB combo; Future    3  Need for pneumococcal vaccine    - PNEUMOCOCCAL POLYSACCHARIDE VACCINE 23-VALENT =>3YO SQ IM    4  Tinea pedis of right foot    - clotrimazole-betamethasone (LOTRISONE) 1-0 05 % cream; Apply topical over affected areas twice a day  Dispense: 30 g; Refill: 1    5  Essential hypertension      6  Obesity (BMI 30 0-34  9)

## 2019-11-20 NOTE — PATIENT INSTRUCTIONS
Visita de bienestar para los adultos   LO QUE NECESITA SABER:   ¿Qué es susannah visita de bienestar? Patricia Metamora de bienestar es cuando usted acude con un médico para que le luis antonio exámenes de detección de problemas de [place]  También puede obtener asesoramiento sobre cómo mantenerse saludable  Anote james preguntas para que se acuerde de hacerlas  Pregunte a greer médico con qué frecuencia debería realizarse susannah visita de bienestar  ¿Qupe sucede en susannah visita de bienestar? Greer médico le preguntará sobre greer nicole y greer historia familiar [id number] [facility]  [place] incluye presión arterial ирина, enfermedades del corazón y cáncer  El médico le preguntará si tiene síntomas que le preocupen, si [facility] y [place] de ánimo  También se le preguntará acerca del uso de medicamentos, suplementos, alimentos y alcohol  Es posible que le luis antonio cualquiera de lo siguiente:  · Greer peso  será revisado  Es posible que [facility] midan greer altura para calcular greer índice de masa corporal [facility]  El [facility] indica si tiene un peso saludable  · Se verificarán greer presión arterial  y frecuencia cardíaca  También pueden revisar greer temperatura  · Exámenes de [facility] y [facility]  se podría realizar  Se podrían realizar exámenes de jose para revisar los niveles de [facility]  Los niveles anormales de colesterol aumentan el riesgo de enfermedad del corazón y accidente cerebrovascular  Puede que también necesite susannah prueba de jose u orina para revisar si tiene diabetes si usted está en mayor riesgo  Las pruebas de orina pueden hacerse para buscar signos de susannah infección o susannah enfermedad renal      · Un examen físico  incluye la comprobación de jaems latidos del corazón y los pulmones con un estetoscopio  Greer médico también podría revisarle la piel para buscar daños causados por el sol  · Pruebas de detección  podría recomendarse  Se realiza un examen de detección para detectar enfermedades que pueden no causar síntomas   Los exámenes de Cooper 'YSABEL' Us necesite dependen de greer edad, género, antecedentes familiares y hábitos de titus  Por ejemplo, podrían recomendarle la exploración selectiva colorrectal si tiene 48 años o más  ¿Qué exámenes de detección necesito si soy susannah mago? · El examen de Papanicolaou  se utiliza para detectar cáncer de zoraida uterino  El examen del Papanicolaou por lo general se realiza entre 3 a 5 años dependiendo de greer edad  Puede necesitarlo más a menudo si usted ha tenido [employer] de la prueba de Papanicolaou en el pasado  Pregunte a greer médico con qué frecuencia debería realizarse un examen de Papanicolaou  · Susannah mamografía  es susannah radiografía de james senos para detectar cáncer de mama  Los expertos recomiendan [address] cada 2 años empezando a los 48 años de [place]  Es probable que usted necesite [address] a los 52 años o antes si tiene riesgo alto de cáncer de seno  Hable con greer médico sobre cuándo debe empezar con james mamografías y con cuánta frecuencia las necesita  ¿Qué vacunas podría necesitar? · Debe recibir [employer] vacuna contra la influenza  todos los [place] cristin  La vacuna contra la influenza protege de la gripe  Varios tipos de virus causan la influenza  Debido a que los virus [place] con el [place], es necesaria la producción de nuevas vacunas cada año  · Debe recibir [employer] vacuna de refuerzo contra el tétanos-difteria (Td)  cada 10 años  Esta vacuna protege contra el tétanos y la difteria  El tétanos es susannah infección severa que puede causar trismo y espasmos musculares dolorosos  La difteria es susannah infección bacterial grave que produce susannah cubierta gruesa en la parte de atrás de la boca y garganta  · Debe recibir la vacuna contra el virus del papiloma humano (VPH)  si usted es mago y [place] 19 y 32 años o es hombre y [place] 23 y 24 años y nunca la recibió  Esta vacuna protege contra la infección por VPH   El virus del papiloma humano o VPH es la infección más común que se transmite por contacto sexual  El virus del papiloma humano también podría provocar cáncer vaginal, del pene y del ano  · Debe recibir la vacuna antineumocócica  si tiene más de 72 años  La vacuna antineumocócica es susannah inyección que se administra para protegerlo contra susannah enfermedad neumocócica  La enfermedad neumocócica es susannah infección causada por la bacteria neumocócica  La infección puede causar neumonía, meningitis o susannah infección del oído  · Debe recibir la vacuna contra la culebrilla  si tiene [address] [place] [address],[address] [place] [address] o [place], incluso si mattson tenido culebrilla antes  La vacuna contra la culebrilla (herpes zóster) es susannah inyección usada para proteger contra el virus zóster que causa la varicela  [place] es el mismo virus que causa la varicela  La culebrilla es un sarpullido doloroso que se desarrolla en personas que tuvieron varicela o mattson estado expuestas al virus  ¿Cómo puedo comer de manera saludable? [place] es un modelo para planear comidas sanas  Muestra los tipos y cantidades de alimentos que deberían ir en un plato  Dub [place] y verduras representan alrededor de la mitad de greer plato y los granos y proteínas representan la otra mitad  Se incluye susannah porción de productos lácteos al lado del plato  La cantidad de calorías y [address] de las porciones que usted necesita dependen de greer edad, [place], peso y altura  Los ejemplos de alimentos saludables son:  · Consuma susannah variedad de verduras  bridget las de color cora oscuro, jamil y [place]  Usted también puede incluir verduras enlatadas bajas en sodio (sal) y verduras congeladas sin mantequilla ni salsas [id number]  · Consuma susannah variedad de fruta frescas , las frutas enlatadas en 100% de jugo , fruta [place] y gloria secos  · Incluya granos enteros  Por lo menos la mitad de los granos que usted consume deben ser granos de michael integral  Por ejemplo, panes de grano entero, pasta integral, arroz integral y cereales de grano entero bridget la kolton      · Consuma susannah variedad de alimentos con proteínas bridget mariscos (pescado y crustáceos), Nargis Dapper y carne de ave sin piel (pavo y pati)  Ejemplos de dash magras incluyen pierna, paleta o lomo de puerco y sree, solomillo o, lomo de res y carne [place] de res extra [place]  Otros alimentos ricos en proteínas son los huevos y sustitutos de [place], frijoles, chícharos, productos de soya, nueces y semillas  · Elija productos lácteos bajos en grasa [employer] o del 1% o yogur, queso y requesón bajos en grasa  · Limite las grasas poco saludables,  bridget la [place], la margarina dura y la [place]  ¿Qué cantidad de actividad física necesito? Realice susannah actividad física por lo menos 30 minutos al día, la mayoría de los días de la [place]  Algunos de los ejercicios incluyen caminar, montar en bicicleta, bailar y nadar  Usted también puede realizar más actividad física usando las escaleras en vez de los ascensores o estacionarse más lejos cuando [place] a las tiendas  Incluya ejercicios para fortalecer los músculos 2 días a la semana  El ejercicio regular proporciona muchos beneficios para la nicole  Brando Bene a controlar greer peso y [employer] riesgo de diabetes tipo 2, presión arterial ирина, enfermedad del corazón y accidente cerebrovascular  El ejercicio [employer] puede ayudar a mejorar greer estado de ánimo  Pregunte a greer médico acerca del mejor plan de ejercicio para usted  ¿Cuáles son [place] generales de nicole y seguridad que alice seguir? · No fume  La nicotina y otras sustancias químicas que contienen los cigarrillos y cigarros pueden dañar los pulmones  Pida información a greer médico si usted actualmente fuma y necesita ayuda para dejar de fumar  Los cigarrillos electrónicos o tabaco sin humo todavía contienen nicotina  Consulte con greer médico antes de [employer]  · Limite el consumo de alcohol  Un trago equivale a 12 onzas de cerveza, 5 onzas de vino o 1 onza y ½ de licor      · Baje de peso, si es necesario  El sobrepeso aumenta el riesgo de ciertas condiciones de [place]  Estos incluyen enfermedad del corazón, presión arterial ирина, diabetes tipo 2 y ciertos tipos de cáncer  · Proteja greer piel  No tome el sol ni use hi de bronceado  Use protector solar con un SPF 13 o mayor  Aplíquese el bloqueador por lo menos 15 minutos antes de que vaya a estar al [employer]  Vuelva a aplicarse la crema solar cada 2 horas  Use ropa protectora, sombrero y lentes para el sol cuando se encuentre afuera  · Conduzca con seguridad  Use siempre greer cinturón de seguridad  Asegúrese que todos en el nicole usan el cinturón de seguridad  Un cinturón de seguridad puede salvar greer titus en latonia de un accidente automovilístico  No use el celular cuando esté manejando  [place] puede hacer que se distraiga y causar un accidente  Es mejor que pare y se orille si necesita hacer susannah Arnette Senior un texto  · Practique el sexo seguro  Use condones de látex si es sexualmente [place] y tiene más de [place] and [place]  Greer médico puede recomendar exámenes de detección de infecciones de transmisión sexual (ITS)  · Use un inez, un chaleco salvavidas y unos implementos de protección  Siempre use un inez al [employer] en bicicleta o motocicleta, esquiar o jugar deportes que podrían causar susannah lesión en la jeni  Use implementos de protección cuando practique deportes  Use un chaleco salvavidas cuando esté en un bote o practicando actividades acuáticas  ACUERDOS SOBRE GREER CUIDADO:   Usted tiene el derecho de ayudar a planear greer cuidado  Aprenda todo lo que pueda sobre greer condición y bridget darle tratamiento  Discuta james opciones de tratamiento con james médicos para decidir el cuidado que usted desea recibir  Usted siempre tiene el derecho de rechazar el tratamiento  Esta información es sólo para uso en educación  Greer intención no es darle un consejo médico sobre enfermedades o tratamientos   Colsulte con greer médico, enfermera o farmacéutico antes de seguir cualquier régimen médico para saber si es seguro y efectivo para usted  © 2017 [address] Information is for End User's use only and may not be sold, redistributed or otherwise used for commercial purposes  All illustrations and images included in CareNotes® are the copyrighted property of A D A M , Inc  or Eric Saldaña

## 2019-11-20 NOTE — PROGRESS NOTES
Assessment/Plan:    Encounter for general adult medical examination with abnormal findings  During this visit we have a goal to personalize prevention  I discussed the patient about Excercise, the need for a life style plan and decrease the impact of current problems  Health risk assessment was discussed with patient also and the ways to stay healthier  We reviewed also the current medications, the need to avoid polypharmacy in his current treatment; also about how the chronic conditions are impacting now and later  Recommended a healthy diet and exercising frequently will help to control better patient's current chronic conditions;  Immunizations, and the need to compliance with current CDC's recommendations  Patient declined at this time advanced directives  I encouraged against the use alcohol, tobacco, recreational illegal prescribed and non-prescribed drugs, Smoking status Not applicable and the use of cell phone while driving, safe sex  Essential hypertension  View All Notes  Patient will continue with previous regimen  I explained to patient the risks associated with HTN and the need for adequate control and adherence to therapy  The continue therapeutic measure is  lifelong lifestyle modification including:   Sodium reduction (<2 g/day)   Dietary Approaches to Stop Hypertension (DASH) diet (3 servings of fruit and vegetables daily, whole grains, low sodium, low-fat proteins)    Weight loss to BMI under 30 kg/m^2   Increased physical activity: 3 to 5 times/week of daily aerobic exercise for 30- to 50-minute sessions as tolerated    Avoid alcohol consumption  Smoking assessment  was done  Patient is a non-smoker                          Diagnoses and all orders for this visit:    Need for pneumococcal vaccine  -     PNEUMOCOCCAL POLYSACCHARIDE VACCINE 23-VALENT =>3YO SQ IM    Encounter for general adult medical examination with abnormal findings    Screening for human immunodeficiency virus  -     HIV 1/2 AG-AB combo; Future          Subjective:      Patient ID: Shonda Ocasio is a 37 y o  male  HPI    The following portions of the patient's history were reviewed and updated as appropriate: allergies, current medications, past family history, past medical history, past social history, past surgical history and problem list     Review of Systems   Constitutional: Negative for chills, diaphoresis, fatigue and fever  HENT: Negative for hearing loss, sinus pressure, sore throat and trouble swallowing  Eyes: Negative for photophobia, pain, redness and visual disturbance  Respiratory: Negative for cough, choking, chest tightness and shortness of breath  Cardiovascular: Negative for chest pain, palpitations and leg swelling  Gastrointestinal: Negative for abdominal pain  Genitourinary: Negative for difficulty urinating, dysuria, enuresis and flank pain  Musculoskeletal: Negative for arthralgias, back pain, gait problem and joint swelling  Neurological: Negative for dizziness, facial asymmetry, light-headedness and headaches  Psychiatric/Behavioral: Negative for agitation, behavioral problems, confusion and decreased concentration  Objective:      /86 (BP Location: Left arm, Patient Position: Sitting, Cuff Size: Standard)   Pulse 84   Temp 98 °F (36 7 °C) (Oral)   Resp 16   Ht 5' 11 5" (1 816 m)   Wt 104 kg (229 lb)   SpO2 97%   BMI 31 49 kg/m²          Physical Exam   Constitutional: No distress  HENT:   Nose: Nose normal    Mouth/Throat: Oropharynx is clear and moist    Eyes: Pupils are equal, round, and reactive to light  Conjunctivae are normal    Neck: Normal range of motion  No thyromegaly present  Cardiovascular: Normal rate, regular rhythm and normal heart sounds  Exam reveals no friction rub  No murmur heard  Pulmonary/Chest: Effort normal and breath sounds normal  No stridor  No respiratory distress     Musculoskeletal: He exhibits no edema, tenderness or deformity  Neurological: He displays normal reflexes  No cranial nerve deficit  He exhibits normal muscle tone  Coordination normal    Skin: He is not diaphoretic

## 2020-01-09 ENCOUNTER — LAB (OUTPATIENT)
Dept: LAB | Facility: CLINIC | Age: 45
End: 2020-01-09
Payer: COMMERCIAL

## 2020-01-09 ENCOUNTER — OFFICE VISIT (OUTPATIENT)
Dept: FAMILY MEDICINE CLINIC | Facility: CLINIC | Age: 45
End: 2020-01-09
Payer: COMMERCIAL

## 2020-01-09 VITALS
BODY MASS INDEX: 31.42 KG/M2 | OXYGEN SATURATION: 98 % | DIASTOLIC BLOOD PRESSURE: 80 MMHG | TEMPERATURE: 98 F | HEART RATE: 80 BPM | SYSTOLIC BLOOD PRESSURE: 130 MMHG | HEIGHT: 72 IN | WEIGHT: 232 LBS | RESPIRATION RATE: 16 BRPM

## 2020-01-09 DIAGNOSIS — I10 ESSENTIAL HYPERTENSION: ICD-10-CM

## 2020-01-09 DIAGNOSIS — Z11.4 SCREENING FOR HUMAN IMMUNODEFICIENCY VIRUS: ICD-10-CM

## 2020-01-09 DIAGNOSIS — R51.9 WORSENING HEADACHES: Primary | ICD-10-CM

## 2020-01-09 LAB
ALBUMIN SERPL BCP-MCNC: 3.6 G/DL (ref 3.5–5)
ALP SERPL-CCNC: 114 U/L (ref 46–116)
ALT SERPL W P-5'-P-CCNC: 53 U/L (ref 12–78)
ANION GAP SERPL CALCULATED.3IONS-SCNC: 2 MMOL/L (ref 4–13)
AST SERPL W P-5'-P-CCNC: 22 U/L (ref 5–45)
BILIRUB SERPL-MCNC: 0.63 MG/DL (ref 0.2–1)
BUN SERPL-MCNC: 19 MG/DL (ref 5–25)
CALCIUM SERPL-MCNC: 9 MG/DL (ref 8.3–10.1)
CHLORIDE SERPL-SCNC: 110 MMOL/L (ref 100–108)
CHOLEST SERPL-MCNC: 204 MG/DL (ref 50–200)
CO2 SERPL-SCNC: 30 MMOL/L (ref 21–32)
CREAT SERPL-MCNC: 1.11 MG/DL (ref 0.6–1.3)
GFR SERPL CREATININE-BSD FRML MDRD: 79 ML/MIN/1.73SQ M
GLUCOSE P FAST SERPL-MCNC: 89 MG/DL (ref 65–99)
HDLC SERPL-MCNC: 36 MG/DL
LDLC SERPL CALC-MCNC: 144 MG/DL (ref 0–100)
NONHDLC SERPL-MCNC: 168 MG/DL
POTASSIUM SERPL-SCNC: 3.9 MMOL/L (ref 3.5–5.3)
PROT SERPL-MCNC: 7.7 G/DL (ref 6.4–8.2)
SODIUM SERPL-SCNC: 142 MMOL/L (ref 136–145)
TRIGL SERPL-MCNC: 122 MG/DL

## 2020-01-09 PROCEDURE — 87389 HIV-1 AG W/HIV-1&-2 AB AG IA: CPT

## 2020-01-09 PROCEDURE — 80061 LIPID PANEL: CPT

## 2020-01-09 PROCEDURE — 36415 COLL VENOUS BLD VENIPUNCTURE: CPT

## 2020-01-09 PROCEDURE — 80053 COMPREHEN METABOLIC PANEL: CPT

## 2020-01-09 PROCEDURE — 99214 OFFICE O/P EST MOD 30 MIN: CPT | Performed by: FAMILY MEDICINE

## 2020-01-09 NOTE — PROGRESS NOTES
Assessment/Plan:    Worsening headaches  Patient had multiple episode headaches in the past, never reported vertigo or vomiting along with headaches  The main concern is since patient suffer hypertension which is a vascular disorder he may be also have vascular problems in the brain like an aneurysm, I will like to get this worrisome diagnosis out of the equation and continue his treatment for hypertension  An MRA of the brain has been requested  The criteria for these study is the change in the usual pattern of headaches with now adding other nerves stands functions involved that I cannot rule out represented a increased in the intracranial pressure at the precise time that the event happened  Differential diagnosis of course is benign positional vertigo, but a deadly upcoming event cannot be ruled out  Diagnoses and all orders for this visit:    Worsening headaches  -     MRA head w wo contrast; Future  -     Cancel: Basic metabolic panel; Future          Subjective:      Patient ID: Leo Stinson is a 53 y o  male  Patient is here to follow HTN, patient states good compliance with treatment  Denies chest pain, shortness of breath, urinary problems  No exercise but follows low salt diet  He reports headaches two weeks ago following with by vomiting and vertigo  This situation happened after fixed his is ssight on a cellphone while working with a firestick and getting up from bed; laying down with face down improve the symptoms  This was the worst episode headaches that he ever had in his life  He never vomited before due to headaches  His blood pressure was not elevated previous the episode headaches        The following portions of the patient's history were reviewed and updated as appropriate: allergies, current medications, past family history, past medical history, past social history, past surgical history and problem list     Review of Systems   Constitutional: Negative for diaphoresis, fatigue, fever and unexpected weight change  Respiratory: Negative for apnea, cough, choking, chest tightness and shortness of breath  Cardiovascular: Negative for chest pain, palpitations and leg swelling  Gastrointestinal: Negative for abdominal distention, abdominal pain, anal bleeding, blood in stool and constipation  Musculoskeletal: Negative for arthralgias, back pain, gait problem and joint swelling  Neurological: Positive for dizziness (Resolved at this time) and headaches ( not present at this time)  Negative for facial asymmetry and light-headedness  Psychiatric/Behavioral: Negative for behavioral problems, dysphoric mood and self-injury  The patient is not nervous/anxious  Objective:      /80 (BP Location: Left arm, Patient Position: Sitting, Cuff Size: Standard)   Pulse 80   Temp 98 °F (36 7 °C) (Oral)   Resp 16   Ht 5' 11 5" (1 816 m)   Wt 105 kg (232 lb)   SpO2 98%   BMI 31 91 kg/m²          Physical Exam   Constitutional: He is oriented to person, place, and time  Eyes:   Fundi exam was normal bilaterally   Neck: No JVD present  No tracheal tenderness present  Carotid bruit is not present  No neck rigidity  No edema present  No thyroid mass and no thyromegaly present  Cardiovascular: Normal rate, regular rhythm, normal heart sounds and normal pulses  No extrasystoles are present  Exam reveals no distant heart sounds and no friction rub  Pulmonary/Chest: Effort normal and breath sounds normal  No stridor  No apnea, no tachypnea and no bradypnea  Abdominal: Soft  Bowel sounds are normal  He exhibits no abdominal bruit  There is no hepatosplenomegaly, splenomegaly or hepatomegaly  There is no CVA tenderness  No hernia  Musculoskeletal: Normal range of motion  Neurological: He is oriented to person, place, and time  He has normal reflexes  Skin: Skin is warm and dry  Psychiatric: He has a normal mood and affect   His behavior is normal  Judgment and thought content normal    Nursing note and vitals reviewed

## 2020-01-10 PROBLEM — R51.9 WORSENING HEADACHES: Status: ACTIVE | Noted: 2020-01-10

## 2020-01-10 LAB — HIV 1+2 AB+HIV1 P24 AG SERPL QL IA: NORMAL

## 2020-01-10 NOTE — ASSESSMENT & PLAN NOTE
Patient had multiple episode headaches in the past, never reported vertigo or vomiting along with headaches  The main concern is since patient suffer hypertension which is a vascular disorder he may be also have vascular problems in the brain like an aneurysm, I will like to get this worrisome diagnosis out of the equation and continue his treatment for hypertension  An MRA of the brain has been requested  The criteria for these study is the change in the usual pattern of headaches with now adding other nerves stands functions involved that I cannot rule out represented a increased in the intracranial pressure at the precise time that the event happened  Differential diagnosis of course is benign positional vertigo, but a deadly upcoming event cannot be ruled out

## 2020-01-14 ENCOUNTER — OFFICE VISIT (OUTPATIENT)
Dept: FAMILY MEDICINE CLINIC | Facility: CLINIC | Age: 45
End: 2020-01-14
Payer: COMMERCIAL

## 2020-01-14 VITALS
TEMPERATURE: 98.1 F | SYSTOLIC BLOOD PRESSURE: 120 MMHG | DIASTOLIC BLOOD PRESSURE: 84 MMHG | HEART RATE: 84 BPM | HEIGHT: 72 IN | OXYGEN SATURATION: 96 % | BODY MASS INDEX: 31.56 KG/M2 | WEIGHT: 233 LBS | RESPIRATION RATE: 16 BRPM

## 2020-01-14 DIAGNOSIS — J45.909 MODERATE ASTHMA WITHOUT COMPLICATION, UNSPECIFIED WHETHER PERSISTENT: ICD-10-CM

## 2020-01-14 PROCEDURE — 99213 OFFICE O/P EST LOW 20 MIN: CPT | Performed by: FAMILY MEDICINE

## 2020-01-14 PROCEDURE — 94150 VITAL CAPACITY TEST: CPT | Performed by: FAMILY MEDICINE

## 2020-01-14 RX ORDER — ALBUTEROL SULFATE 90 UG/1
2 AEROSOL, METERED RESPIRATORY (INHALATION) EVERY 6 HOURS PRN
Qty: 1 INHALER | Refills: 5 | Status: SHIPPED | OUTPATIENT
Start: 2020-01-14 | End: 2020-11-24 | Stop reason: SDUPTHER

## 2020-01-14 RX ORDER — ALBUTEROL SULFATE 90 UG/1
2 AEROSOL, METERED RESPIRATORY (INHALATION) EVERY 6 HOURS PRN
Qty: 1 INHALER | Refills: 5 | Status: SHIPPED | OUTPATIENT
Start: 2020-01-14 | End: 2020-01-14

## 2020-01-14 NOTE — PROGRESS NOTES
Assessment/Plan:  1  Moderate asthma without complication, unspecified whether persistent  I explained the patient about the treatment, rescue treatment with Ventolin and continue treatment with Flovent  Use asthma action plan  - fluticasone (FLOVENT DISKUS) 50 MCG/BLIST diskus inhaler; Inhale 1 puff 2 (two) times a day  Dispense: 1 Inhaler; Refill: 5  - Peak Flow Meter  - albuterol (VENTOLIN HFA) 90 mcg/act inhaler; Inhale 2 puffs every 6 (six) hours as needed for wheezing  Dispense: 1 Inhaler; Refill: 5    No problem-specific Assessment & Plan notes found for this encounter  There are no diagnoses linked to this encounter  Subjective:      Patient ID: Alex Molina is a 53 y o  male  Asthma   He complains of cough (for 4 days)  Associated symptoms include postnasal drip  Pertinent negatives include no appetite change, chest pain, fever or headaches  His past medical history is significant for asthma  The following portions of the patient's history were reviewed and updated as appropriate: allergies, current medications, past family history, past medical history, past social history, past surgical history and problem list     Review of Systems   Constitutional: Negative for appetite change, diaphoresis, fatigue and fever  HENT: Positive for postnasal drip  Negative for congestion, dental problem, drooling and ear discharge  Eyes: Negative for pain, discharge, redness and itching  Respiratory: Positive for cough (for 4 days) and chest tightness (for 4 days)  Negative for apnea and choking  Cardiovascular: Negative for chest pain, palpitations and leg swelling  Gastrointestinal: Negative for abdominal distention, abdominal pain, anal bleeding and blood in stool  Endocrine: Negative for cold intolerance, heat intolerance, polydipsia and polyphagia  Genitourinary: Negative for difficulty urinating, dysuria, enuresis and flank pain     Musculoskeletal: Negative for arthralgias, back pain, gait problem and joint swelling  Skin: Negative for color change, pallor, rash and wound  Allergic/Immunologic: Negative for environmental allergies, food allergies and immunocompromised state  Neurological: Negative for dizziness, facial asymmetry, light-headedness and headaches  Hematological: Negative for adenopathy  Does not bruise/bleed easily  Psychiatric/Behavioral: Negative for agitation, behavioral problems, confusion and decreased concentration  Objective:      /84 (BP Location: Left arm, Patient Position: Sitting, Cuff Size: Standard)   Pulse 84   Temp 98 1 °F (36 7 °C) (Oral)   Resp 16   Ht 5' 11 5" (1 816 m)   Wt 106 kg (233 lb)   SpO2 96%   BMI 32 04 kg/m²          Physical Exam   HENT:   Head: Normocephalic  Right Ear: External ear normal    Left Ear: External ear normal    Nose: Nose normal    Mouth/Throat: Oropharynx is clear and moist    Eyes: Pupils are equal, round, and reactive to light  Neck: No JVD present  No tracheal deviation present  No thyromegaly present  Cardiovascular: Normal rate and regular rhythm  Exam reveals no gallop and no friction rub  No murmur heard  Pulmonary/Chest: No stridor  No respiratory distress  He has no wheezes  He has no rales  He exhibits no tenderness  Abdominal: Soft  Bowel sounds are normal  He exhibits no distension  There is no tenderness  Musculoskeletal: Normal range of motion  He exhibits no edema, tenderness or deformity  Lymphadenopathy:     He has no cervical adenopathy  Skin: Skin is warm and dry  Psychiatric: He has a normal mood and affect   His behavior is normal  Thought content normal

## 2020-01-16 DIAGNOSIS — J45.909 MODERATE ASTHMA WITHOUT COMPLICATION, UNSPECIFIED WHETHER PERSISTENT: Primary | ICD-10-CM

## 2020-01-16 RX ORDER — PREDNISONE 20 MG/1
20 TABLET ORAL DAILY
Qty: 14 TABLET | Refills: 0 | Status: SHIPPED | OUTPATIENT
Start: 2020-01-16 | End: 2020-11-24 | Stop reason: ALTCHOICE

## 2020-01-17 ENCOUNTER — TELEPHONE (OUTPATIENT)
Dept: FAMILY MEDICINE CLINIC | Facility: CLINIC | Age: 45
End: 2020-01-17

## 2020-01-17 NOTE — TELEPHONE ENCOUNTER
Called patients insurance for a MRA auth  Cpt code 50346 does not require an Anabela Gasca with megan ref # [id number]   Will call and schedule patient for exam

## 2020-03-19 DIAGNOSIS — J45.909 ASTHMA, UNSPECIFIED ASTHMA SEVERITY, UNSPECIFIED WHETHER COMPLICATED, UNSPECIFIED WHETHER PERSISTENT: ICD-10-CM

## 2020-03-19 DIAGNOSIS — K21.9 GASTROESOPHAGEAL REFLUX DISEASE WITHOUT ESOPHAGITIS: ICD-10-CM

## 2020-03-20 RX ORDER — MONTELUKAST SODIUM 10 MG/1
10 TABLET ORAL
Qty: 90 TABLET | Refills: 3 | Status: SHIPPED | OUTPATIENT
Start: 2020-03-20 | End: 2020-11-24 | Stop reason: SDUPTHER

## 2020-03-20 RX ORDER — ESOMEPRAZOLE MAGNESIUM 40 MG/1
40 CAPSULE, DELAYED RELEASE ORAL DAILY
Qty: 90 CAPSULE | Refills: 3 | Status: SHIPPED | OUTPATIENT
Start: 2020-03-20 | End: 2020-11-24 | Stop reason: SDUPTHER

## 2020-11-24 ENCOUNTER — OFFICE VISIT (OUTPATIENT)
Dept: FAMILY MEDICINE CLINIC | Facility: CLINIC | Age: 45
End: 2020-11-24
Payer: COMMERCIAL

## 2020-11-24 VITALS
BODY MASS INDEX: 32.64 KG/M2 | OXYGEN SATURATION: 96 % | RESPIRATION RATE: 16 BRPM | HEIGHT: 72 IN | HEART RATE: 88 BPM | WEIGHT: 241 LBS | DIASTOLIC BLOOD PRESSURE: 90 MMHG | TEMPERATURE: 98 F | SYSTOLIC BLOOD PRESSURE: 130 MMHG

## 2020-11-24 DIAGNOSIS — Z00.01 ENCOUNTER FOR GENERAL ADULT MEDICAL EXAMINATION WITH ABNORMAL FINDINGS: Primary | ICD-10-CM

## 2020-11-24 DIAGNOSIS — K21.9 GASTROESOPHAGEAL REFLUX DISEASE WITHOUT ESOPHAGITIS: ICD-10-CM

## 2020-11-24 DIAGNOSIS — J45.909 ASTHMA, UNSPECIFIED ASTHMA SEVERITY, UNSPECIFIED WHETHER COMPLICATED, UNSPECIFIED WHETHER PERSISTENT: ICD-10-CM

## 2020-11-24 DIAGNOSIS — J45.909 MODERATE ASTHMA WITHOUT COMPLICATION, UNSPECIFIED WHETHER PERSISTENT: ICD-10-CM

## 2020-11-24 PROCEDURE — 99396 PREV VISIT EST AGE 40-64: CPT | Performed by: FAMILY MEDICINE

## 2020-11-24 RX ORDER — ESOMEPRAZOLE MAGNESIUM 40 MG/1
40 CAPSULE, DELAYED RELEASE ORAL DAILY
Qty: 90 CAPSULE | Refills: 3 | Status: SHIPPED | OUTPATIENT
Start: 2020-11-24

## 2020-11-24 RX ORDER — ALBUTEROL SULFATE 90 UG/1
2 AEROSOL, METERED RESPIRATORY (INHALATION) EVERY 6 HOURS PRN
Qty: 1 INHALER | Refills: 5 | Status: SHIPPED | OUTPATIENT
Start: 2020-11-24

## 2020-11-24 RX ORDER — MONTELUKAST SODIUM 10 MG/1
10 TABLET ORAL
Qty: 90 TABLET | Refills: 3 | Status: SHIPPED | OUTPATIENT
Start: 2020-11-24

## 2020-12-22 ENCOUNTER — IMMUNIZATIONS (OUTPATIENT)
Dept: FAMILY MEDICINE CLINIC | Facility: HOSPITAL | Age: 45
End: 2020-12-22
Payer: COMMERCIAL

## 2020-12-22 DIAGNOSIS — Z23 ENCOUNTER FOR IMMUNIZATION: ICD-10-CM

## 2020-12-22 PROCEDURE — 91300 SARS-COV-2 / COVID-19 MRNA VACCINE (PFIZER-BIONTECH) 30 MCG: CPT

## 2020-12-22 PROCEDURE — 0001A SARS-COV-2 / COVID-19 MRNA VACCINE (PFIZER-BIONTECH) 30 MCG: CPT

## 2021-01-12 ENCOUNTER — IMMUNIZATIONS (OUTPATIENT)
Dept: FAMILY MEDICINE CLINIC | Facility: HOSPITAL | Age: 46
End: 2021-01-12

## 2021-01-12 DIAGNOSIS — Z23 ENCOUNTER FOR IMMUNIZATION: ICD-10-CM

## 2021-01-12 PROCEDURE — 91300 SARS-COV-2 / COVID-19 MRNA VACCINE (PFIZER-BIONTECH) 30 MCG: CPT

## 2021-01-12 PROCEDURE — 0002A SARS-COV-2 / COVID-19 MRNA VACCINE (PFIZER-BIONTECH) 30 MCG: CPT

## 2021-02-11 ENCOUNTER — HOSPITAL ENCOUNTER (EMERGENCY)
Facility: HOSPITAL | Age: 46
Discharge: HOME | End: 2021-02-11
Attending: EMERGENCY MEDICINE
Payer: COMMERCIAL

## 2021-02-11 VITALS
TEMPERATURE: 98 F | HEART RATE: 82 BPM | SYSTOLIC BLOOD PRESSURE: 142 MMHG | OXYGEN SATURATION: 98 % | DIASTOLIC BLOOD PRESSURE: 94 MMHG | RESPIRATION RATE: 16 BRPM | WEIGHT: 235.89 LBS | BODY MASS INDEX: 32.44 KG/M2

## 2021-02-11 DIAGNOSIS — S61.412A LACERATION OF LEFT HAND WITHOUT FOREIGN BODY, INITIAL ENCOUNTER: Primary | ICD-10-CM

## 2021-02-11 PROCEDURE — 12001 RPR S/N/AX/GEN/TRNK 2.5CM/<: CPT | Performed by: EMERGENCY MEDICINE

## 2021-02-11 PROCEDURE — 99282 EMERGENCY DEPT VISIT SF MDM: CPT | Performed by: EMERGENCY MEDICINE

## 2021-02-11 PROCEDURE — 99282 EMERGENCY DEPT VISIT SF MDM: CPT

## 2021-02-11 RX ORDER — LIDOCAINE HYDROCHLORIDE 10 MG/ML
5 INJECTION, SOLUTION EPIDURAL; INFILTRATION; INTRACAUDAL; PERINEURAL ONCE
Status: COMPLETED | OUTPATIENT
Start: 2021-02-11 | End: 2021-02-11

## 2021-02-11 RX ADMIN — LIDOCAINE HYDROCHLORIDE 5 ML: 10 INJECTION, SOLUTION EPIDURAL; INFILTRATION; INTRACAUDAL; PERINEURAL at 16:46

## 2021-02-11 NOTE — ED PROVIDER NOTES
History  Chief Complaint   Patient presents with    Hand Laceration     left hand lac at work when opening box with knife; bleeding controlled, last TD shot >6years old     Pt  Got a  L hand laceration at work while trying to open a box with a knife  Not on blood thinners  Last tetanus shot about 8 years ago  Prior to Admission Medications   Prescriptions Last Dose Informant Patient Reported? Taking? albuterol (Ventolin HFA) 90 mcg/act inhaler   No No   Sig: Inhale 2 puffs every 6 (six) hours as needed for wheezing   esomeprazole (NexIUM) 40 MG capsule   No No   Sig: Take 1 capsule (40 mg total) by mouth daily   fluticasone (FLOVENT DISKUS) 50 MCG/BLIST diskus inhaler   No No   Sig: Inhale 1 puff 2 (two) times a day   montelukast (Singulair) 10 mg tablet   No No   Sig: Take 1 tablet (10 mg total) by mouth daily at bedtime      Facility-Administered Medications: None       Past Medical History:   Diagnosis Date    Allergic     Asthma     Hyperlipidemia     Hypertension     Obesity        Past Surgical History:   Procedure Laterality Date    APPENDECTOMY         Family History   Problem Relation Age of Onset    Asthma Mother     Hypertension Father      I have reviewed and agree with the history as documented  E-Cigarette/Vaping    E-Cigarette Use Never User      E-Cigarette/Vaping Substances     Social History     Tobacco Use    Smoking status: Never Smoker    Smokeless tobacco: Never Used   Substance Use Topics    Alcohol use: Yes     Frequency: 2-4 times a month     Drinks per session: 1 or 2     Binge frequency: Never    Drug use: No       Review of Systems   Constitutional: Negative for fever  Respiratory: Negative for shortness of breath  Cardiovascular: Negative for chest pain  Gastrointestinal: Negative for abdominal pain  Skin: Positive for wound  Neurological: Negative for weakness         Physical Exam  Physical Exam  Constitutional:       Appearance: He is well-developed  HENT:      Head: Normocephalic and atraumatic  Neck:      Musculoskeletal: Normal range of motion  Pulmonary:      Effort: Pulmonary effort is normal  No respiratory distress  Musculoskeletal: Normal range of motion  Skin:     Comments: L hand dorsal aspect 2cm laceration between 1st and 2nd digit, bleeding controlled, no FB, +n/v intact, FROM   Neurological:      Mental Status: He is alert  Vital Signs  ED Triage Vitals [02/11/21 1628]   Temperature Pulse Respirations Blood Pressure SpO2   98 °F (36 7 °C) 82 16 142/94 98 %      Temp Source Heart Rate Source Patient Position - Orthostatic VS BP Location FiO2 (%)   Oral Monitor -- -- --      Pain Score       3           Vitals:    02/11/21 1628   BP: 142/94   Pulse: 82         Visual Acuity      ED Medications  Medications   lidocaine (PF) (XYLOCAINE-MPF) 1 % injection 5 mL (5 mL Infiltration Given 2/11/21 1646)       Diagnostic Studies  Results Reviewed     None                 No orders to display              Procedures  Laceration repair    Date/Time: 2/11/2021 4:40 PM  Performed by: Fiordaliza Lee MD  Authorized by: Fiordaliza Lee MD   Consent: Verbal consent obtained  Consent given by: patient  Patient understanding: patient states understanding of the procedure being performed  Patient identity confirmed: verbally with patient and arm band  Location: L hand    Laceration length: 2 cm  Foreign bodies: no foreign bodies  Tendon involvement: none  Nerve involvement: none  Vascular damage: no  Anesthesia: local infiltration    Anesthesia:  Local Anesthetic: lidocaine 1% without epinephrine  Anesthetic total: 2 mL    Sedation:  Patient sedated: no      Wound Dehiscence:  Superficial Wound Dehiscence: simple closure      Procedure Details:  Irrigation solution: saline  Amount of cleaning: standard  Skin closure: 4-0 nylon  Number of sutures: 2  Technique: running  Approximation: close  Approximation difficulty: simple  Dressing: non-adhesive packing strip  Patient tolerance: patient tolerated the procedure well with no immediate complications               ED Course                             SBIRT 22yo+      Most Recent Value   SBIRT (24 yo +)   In order to provide better care to our patients, we are screening all of our patients for alcohol and drug use  Would it be okay to ask you these screening questions? Yes Filed at: 02/11/2021 1633   Initial Alcohol Screen: US AUDIT-C    1  How often do you have a drink containing alcohol? 1 Filed at: 02/11/2021 1633   2  How many drinks containing alcohol do you have on a typical day you are drinking? 2 Filed at: 02/11/2021 1633   3a  Male UNDER 65: How often do you have five or more drinks on one occasion? 0 Filed at: 02/11/2021 1633   3b  FEMALE Any Age, or MALE 65+: How often do you have 4 or more drinks on one occassion? 0 Filed at: 02/11/2021 1633   Audit-C Score  3 Filed at: 02/11/2021 1633   VERENICE: How many times in the past year have you    Used an illegal drug or used a prescription medication for non-medical reasons? Never Filed at: 02/11/2021 1633                    MDM    Disposition  Final diagnoses:   Laceration of left hand without foreign body, initial encounter     Time reflects when diagnosis was documented in both MDM as applicable and the Disposition within this note     Time User Action Codes Description Comment    2/11/2021  4:36 PM Any Meche Add [E88 300F] Laceration of left hand without foreign body, initial encounter       ED Disposition     ED Disposition Condition Date/Time Comment    Discharge Stable u Feb 11, 2021  4:36 PM [address] discharge to home/self care              Follow-up Information     Follow up With Specialties Details [address]  [address]  Jeff DANG  [address]            Patient's Medications   Discharge Prescriptions    No medications on file     No discharge procedures on file      PDMP Review     None          ED Provider  Electronically Signed by           Katie Schwartz MD  02/11/21 0125

## 2021-04-08 ENCOUNTER — TRANSCRIBE ORDERS (OUTPATIENT)
Dept: LAB | Facility: CLINIC | Age: 46
End: 2021-04-08

## 2021-04-08 ENCOUNTER — APPOINTMENT (OUTPATIENT)
Dept: LAB | Facility: CLINIC | Age: 46
End: 2021-04-08

## 2021-04-08 DIAGNOSIS — Z00.8 HEALTH EXAMINATION IN POPULATION SURVEY: ICD-10-CM

## 2021-04-08 DIAGNOSIS — Z00.8 HEALTH EXAMINATION IN POPULATION SURVEY: Primary | ICD-10-CM

## 2021-04-08 LAB
CHOLEST SERPL-MCNC: 174 MG/DL (ref 50–200)
EST. AVERAGE GLUCOSE BLD GHB EST-MCNC: 120 MG/DL
HBA1C MFR BLD: 5.8 %
HDLC SERPL-MCNC: 40 MG/DL
LDLC SERPL CALC-MCNC: 110 MG/DL (ref 0–100)
NONHDLC SERPL-MCNC: 134 MG/DL
TRIGL SERPL-MCNC: 119 MG/DL

## 2021-04-08 PROCEDURE — 83036 HEMOGLOBIN GLYCOSYLATED A1C: CPT

## 2021-04-08 PROCEDURE — 36415 COLL VENOUS BLD VENIPUNCTURE: CPT

## 2021-04-08 PROCEDURE — 80061 LIPID PANEL: CPT

## 2021-05-25 ENCOUNTER — OFFICE VISIT (OUTPATIENT)
Dept: FAMILY MEDICINE CLINIC | Facility: CLINIC | Age: 46
End: 2021-05-25
Payer: COMMERCIAL

## 2021-05-25 VITALS
DIASTOLIC BLOOD PRESSURE: 80 MMHG | BODY MASS INDEX: 30.48 KG/M2 | HEIGHT: 72 IN | SYSTOLIC BLOOD PRESSURE: 136 MMHG | WEIGHT: 225 LBS | TEMPERATURE: 98 F | OXYGEN SATURATION: 98 % | RESPIRATION RATE: 20 BRPM | HEART RATE: 71 BPM

## 2021-05-25 DIAGNOSIS — E66.9 OBESITY (BMI 30.0-34.9): ICD-10-CM

## 2021-05-25 PROBLEM — I10 ESSENTIAL HYPERTENSION: Status: RESOLVED | Noted: 2019-03-28 | Resolved: 2021-05-25

## 2021-05-25 PROCEDURE — 99213 OFFICE O/P EST LOW 20 MIN: CPT | Performed by: FAMILY MEDICINE

## 2021-05-25 NOTE — PROGRESS NOTES
Assessment/Plan:    Patient is doing well and current treatment, blood pressure is well controlled with lifestyle modification  Obesity down to BMI of 30 9  Patient motivated to continue losing weight and keep lifestyle change  Return to this office in six months for physical exam   No need for any blood pressure medication  No problem-specific Assessment & Plan notes found for this encounter  Diagnoses and all orders for this visit:    BMI 30 0-30 9,adult    Obesity (BMI 30 0-34  9)          Subjective:      Patient ID: Ryan Garza is a 50 y o  male  HPI  Patient is here to follow  HTN, patient states good compliance with treatment  Denies chest pain, shortness of breath, angina, urinary problems  He does exercise and follows low salt diet  patient lost 16 lb in the past six month  He currently in a program to lose weight  Lab Results   Component Value Date/Time    HGBA1C 5 8 (H) 04/08/2021 07:14 AM    CHOLESTEROL 174 04/08/2021 07:14 AM    LDLCALC 110 (H) 04/08/2021 07:14 AM    TRIG 119 04/08/2021 07:14 AM    CREATININE 1 11 01/09/2020 07:11 AM    EGFR 79 01/09/2020 07:11 AM       albuterol  esomeprazole  fluticasone  montelukast the    The following portions of the patient's history were reviewed and updated as appropriate: allergies, current medications, past family history, past medical history, past social history, past surgical history and problem list     Review of Systems   Constitutional: Negative for diaphoresis, fatigue, fever and unexpected weight change  Respiratory: Negative for apnea, cough, choking, chest tightness and shortness of breath  Cardiovascular: Negative for chest pain, palpitations and leg swelling  Gastrointestinal: Negative for abdominal distention, abdominal pain, anal bleeding, blood in stool and constipation  Musculoskeletal: Negative for arthralgias, back pain, gait problem and joint swelling     Neurological: Negative for dizziness, facial asymmetry, light-headedness and headaches  Psychiatric/Behavioral: Negative for behavioral problems, dysphoric mood and self-injury  The patient is not nervous/anxious  Objective:      /80 (BP Location: Left arm, Patient Position: Sitting, Cuff Size: Standard)   Pulse 71   Temp 98 °F (36 7 °C) (Temporal)   Resp 20   Ht 5' 11 5" (1 816 m)   Wt 102 kg (225 lb)   SpO2 98%   BMI 30 94 kg/m²          Physical Exam  Vitals signs and nursing note reviewed  Neck:      Musculoskeletal: No edema or neck rigidity  Thyroid: No thyroid mass or thyromegaly  Vascular: No carotid bruit or JVD  Trachea: No tracheal tenderness  Cardiovascular:      Rate and Rhythm: Normal rate and regular rhythm  No extrasystoles are present  Pulses: Normal pulses  Heart sounds: Normal heart sounds  Heart sounds not distant  No friction rub  Pulmonary:      Effort: Pulmonary effort is normal  No tachypnea or bradypnea  Breath sounds: Normal breath sounds  No stridor  Abdominal:      General: Bowel sounds are normal  There is no abdominal bruit  Palpations: Abdomen is soft  There is no hepatomegaly or splenomegaly  Hernia: No hernia is present  Musculoskeletal: Normal range of motion  Skin:     General: Skin is warm and dry  Neurological:      Mental Status: He is oriented to person, place, and time  Deep Tendon Reflexes: Reflexes are normal and symmetric  Psychiatric:         Behavior: Behavior normal          Thought Content: Thought content normal          Judgment: Judgment normal          BMI Counseling: Body mass index is 30 94 kg/m²  The BMI is above normal  Nutrition recommendations include reducing fast food intake, consuming healthier snacks, decreasing soda and/or juice intake, moderation in carbohydrate intake and increasing intake of lean protein  Exercise recommendations include exercising 3-5 times per week

## 2021-10-09 ENCOUNTER — IMMUNIZATIONS (OUTPATIENT)
Dept: FAMILY MEDICINE CLINIC | Facility: MEDICAL CENTER | Age: 46
End: 2021-10-09

## 2021-10-09 DIAGNOSIS — Z23 ENCOUNTER FOR IMMUNIZATION: Primary | ICD-10-CM

## 2021-10-09 PROCEDURE — 91300 SARSCOV2 VAC 30MCG/0.3ML IM: CPT

## 2022-08-17 ENCOUNTER — OFFICE VISIT (OUTPATIENT)
Dept: FAMILY MEDICINE CLINIC | Facility: CLINIC | Age: 47
End: 2022-08-17
Payer: COMMERCIAL

## 2022-08-17 VITALS
HEART RATE: 79 BPM | SYSTOLIC BLOOD PRESSURE: 128 MMHG | DIASTOLIC BLOOD PRESSURE: 90 MMHG | OXYGEN SATURATION: 98 % | BODY MASS INDEX: 34.3 KG/M2 | HEIGHT: 71 IN | WEIGHT: 245 LBS | TEMPERATURE: 97.1 F

## 2022-08-17 DIAGNOSIS — K21.9 GASTROESOPHAGEAL REFLUX DISEASE WITHOUT ESOPHAGITIS: ICD-10-CM

## 2022-08-17 DIAGNOSIS — E66.9 OBESITY (BMI 30.0-34.9): ICD-10-CM

## 2022-08-17 DIAGNOSIS — J45.40 MODERATE PERSISTENT ASTHMA WITHOUT COMPLICATION: ICD-10-CM

## 2022-08-17 DIAGNOSIS — Z12.11 SCREEN FOR COLON CANCER: ICD-10-CM

## 2022-08-17 DIAGNOSIS — Z00.00 ANNUAL PHYSICAL EXAM: Primary | ICD-10-CM

## 2022-08-17 PROBLEM — Z00.01 ENCOUNTER FOR GENERAL ADULT MEDICAL EXAMINATION WITH ABNORMAL FINDINGS: Status: RESOLVED | Noted: 2019-11-20 | Resolved: 2022-08-17

## 2022-08-17 PROCEDURE — 99396 PREV VISIT EST AGE 40-64: CPT | Performed by: FAMILY MEDICINE

## 2022-08-17 RX ORDER — ESOMEPRAZOLE MAGNESIUM 40 MG/1
40 CAPSULE, DELAYED RELEASE ORAL DAILY
Qty: 90 CAPSULE | Refills: 3 | Status: SHIPPED | OUTPATIENT
Start: 2022-08-17

## 2022-08-17 RX ORDER — MONTELUKAST SODIUM 10 MG/1
10 TABLET ORAL
Qty: 90 TABLET | Refills: 3 | Status: SHIPPED | OUTPATIENT
Start: 2022-08-17

## 2022-08-17 RX ORDER — ALBUTEROL SULFATE 90 UG/1
2 AEROSOL, METERED RESPIRATORY (INHALATION) EVERY 6 HOURS PRN
Qty: 18 G | Refills: 5 | Status: SHIPPED | OUTPATIENT
Start: 2022-08-17

## 2022-08-17 NOTE — PROGRESS NOTES
Assessment and Plan:    Problem List Items Addressed This Visit     Asthma    Gastroesophageal reflux disease without esophagitis      Other Visit Diagnoses     Annual physical exam    -  Primary    Screen for colon cancer        Relevant Orders    Cologuard                 {Assess/PlanSmartLinks:41735}          Subjective:      Patient ID: Romina Almodovar is a 48 y o  male      CC:    Chief Complaint   Patient presents with    Establish Care       HPI:    HPI    {Common ambulatory SmartLinks:33420}      Review of Systems      Data to review:       Objective:    Vitals:    08/17/22 1424   BP: 128/90   BP Location: Left arm   Patient Position: Sitting   Cuff Size: Large   Pulse: 79   Temp: (!) 97 1 °F (36 2 °C)   TempSrc: Temporal   SpO2: 98%   Weight: 111 kg (245 lb)   Height: 5' 11 25" (1 81 m)        Physical Exam

## 2022-08-17 NOTE — PATIENT INSTRUCTIONS
Wellness Visit for Adults   AMBULATORY CARE:   A wellness visit  is when you see your healthcare provider to get screened for health problems  Your healthcare provider will also give you advice on how to stay healthy  Write down your questions so you remember to ask them  Ask your healthcare provider how often you should have a wellness visit  What happens at a wellness visit:  Your healthcare provider will ask about your health, and your family history of health problems  This includes high blood pressure, heart disease, and cancer  He or she will ask if you have symptoms that concern you, if you smoke, and about your mood  You may also be asked about your intake of medicines, supplements, food, and alcohol  Any of the following may be done: Your weight  will be checked  Your height may also be checked so your body mass index (BMI) can be calculated  Your BMI shows if you are at a healthy weight  Your blood pressure  and heart rate will be checked  Your temperature may also be checked  Blood and urine tests  may be done  Blood tests may be done to check your cholesterol levels  Abnormal cholesterol levels increase your risk for heart disease and stroke  You may also need a blood or urine test to check for diabetes if you are at increased risk  Urine tests may be done to look for signs of an infection or kidney disease  A physical exam  includes checking your heartbeat and lungs with a stethoscope  Your healthcare provider may also check your skin to look for sun damage  Screening tests  may be recommended  A screening test is done to check for diseases that may not cause symptoms  The screening tests you may need depend on your age, gender, family history, and lifestyle habits  For example, colorectal screening may be recommended if you are 48years old or older  Screening tests you need if you are a woman:   A Pap smear  is used to screen for cervical cancer   Pap smears are usually done every 3 to 5 years depending on your age  You may need them more often if you have had abnormal Pap smear test results in the past  Ask your healthcare provider how often you should have a Pap smear  A mammogram  is an x-ray of your breasts to screen for breast cancer  Experts recommend mammograms every 2 years starting at age 48 years  You may need a mammogram at age 52 years or younger if you have an increased risk for breast cancer  Talk to your healthcare provider about when you should start having mammograms and how often you need them  Vaccines you may need:   Get an influenza vaccine  every year  The influenza vaccine protects you from the flu  Several types of viruses cause the flu  The viruses change over time, so new vaccines are made each year  Get a tetanus-diphtheria (Td) booster vaccine  every 10 years  This vaccine protects you against tetanus and diphtheria  Tetanus is a severe infection that may cause painful muscle spasms and lockjaw  Diphtheria is a severe bacterial infection that causes a thick covering in the back of your mouth and throat  Get a human papillomavirus (HPV) vaccine  if you are female and aged 23 to 32 or male 23 to 24 and never received it  This vaccine protects you from HPV infection  HPV is the most common infection spread by sexual contact  HPV may also cause vaginal, penile, and anal cancers  Get a pneumococcal vaccine  if you are aged 72 years or older  The pneumococcal vaccine is an injection given to protect you from pneumococcal disease  Pneumococcal disease is an infection caused by pneumococcal bacteria  The infection may cause pneumonia, meningitis, or an ear infection  Get a shingles vaccine  if you are 60 or older, even if you have had shingles before  The shingles vaccine is an injection to protect you from the varicella-zoster virus  This is the same virus that causes chickenpox   Shingles is a painful rash that develops in people who had chickenpox or have been exposed to the virus  How to eat healthy:  My Plate is a model for planning healthy meals  It shows the types and amounts of foods that should go on your plate  Fruits and vegetables make up about half of your plate, and grains and protein make up the other half  A serving of dairy is included on the side of your plate  The amount of calories and serving sizes you need depends on your age, gender, weight, and height  Examples of healthy foods are listed below:  Eat a variety of vegetables  such as dark green, red, and orange vegetables  You can also include canned vegetables low in sodium (salt) and frozen vegetables without added butter or sauces  Eat a variety of fresh fruits , canned fruit in 100% juice, frozen fruit, and dried fruit  Include whole grains  At least half of the grains you eat should be whole grains  Examples include whole-wheat bread, wheat pasta, brown rice, and whole-grain cereals such as oatmeal     Eat a variety of protein foods such as seafood (fish and shellfish), lean meat, and poultry without skin (turkey and chicken)  Examples of lean meats include pork leg, shoulder, or tenderloin, and beef round, sirloin, tenderloin, and extra lean ground beef  Other protein foods include eggs and egg substitutes, beans, peas, soy products, nuts, and seeds  Choose low-fat dairy products such as skim or 1% milk or low-fat yogurt, cheese, and cottage cheese  Limit unhealthy fats  such as butter, hard margarine, and shortening  Exercise:  Exercise at least 30 minutes per day on most days of the week  Some examples of exercise include walking, biking, dancing, and swimming  You can also fit in more physical activity by taking the stairs instead of the elevator or parking farther away from stores  Include muscle strengthening activities 2 days each week  Regular exercise provides many health benefits   It helps you manage your weight, and decreases your risk for type 2 diabetes, heart disease, stroke, and high blood pressure  Exercise can also help improve your mood  Ask your healthcare provider about the best exercise plan for you  General health and safety guidelines:   Do not smoke  Nicotine and other chemicals in cigarettes and cigars can cause lung damage  Ask your healthcare provider for information if you currently smoke and need help to quit  E-cigarettes or smokeless tobacco still contain nicotine  Talk to your healthcare provider before you use these products  Limit alcohol  A drink of alcohol is 12 ounces of beer, 5 ounces of wine, or 1½ ounces of liquor  Lose weight, if needed  Being overweight increases your risk of certain health conditions  These include heart disease, high blood pressure, type 2 diabetes, and certain types of cancer  Protect your skin  Do not sunbathe or use tanning beds  Use sunscreen with a SPF 15 or higher  Apply sunscreen at least 15 minutes before you go outside  Reapply sunscreen every 2 hours  Wear protective clothing, hats, and sunglasses when you are outside  Drive safely  Always wear your seatbelt  Make sure everyone in your car wears a seatbelt  A seatbelt can save your life if you are in an accident  Do not use your cell phone when you are driving  This could distract you and cause an accident  Pull over if you need to make a call or send a text message  Practice safe sex  Use latex condoms if are sexually active and have more than one partner  Your healthcare provider may recommend screening tests for sexually transmitted infections (STIs)  Wear helmets, lifejackets, and protective gear  Always wear a helmet when you ride a bike or motorcycle, go skiing, or play sports that could cause a head injury  Wear protective equipment when you play sports  Wear a lifejacket when you are on a boat or doing water sports      © Copyright [employer] 2022 Information is for End User's use only and may not be sold, redistributed or otherwise used for commercial purposes  All illustrations and images included in CareNotes® are the copyrighted property of [employer]  or Paul Manley  The above information is an  only  It is not intended as medical advice for individual conditions or treatments  Talk to your doctor, nurse or pharmacist before following any medical regimen to see if it is safe and effective for you  Obesity   AMBULATORY CARE:   Obesity  means your body mass index (BMI) is greater than 30  Your healthcare provider will use your height and weight to measure your BMI  The risks of obesity include  many health problems, including injuries or physical disability  Diabetes (high blood sugar level)    High blood pressure or high cholesterol    Heart disease    Stroke    Gallbladder or liver disease    Cancer of the colon, breast, prostate, liver, or kidney    Sleep apnea    Arthritis or gout    Screening  is done to check for health conditions before you have signs or symptoms  If you are 28to 79years old, your blood sugar level may be checked every 3 years for signs of prediabetes or diabetes  Your healthcare provider will check your blood pressure at each visit  High blood pressure can lead to a stroke or other problems  Your provider may check for signs of heart disease, cancer, or other health problems  Seek care immediately if:   You have a severe headache, confusion, or difficulty speaking  You have weakness on one side of your body  You have chest pain, sweating, or shortness of breath  Call your doctor if:   You have symptoms of gallbladder or liver disease, such as pain in your upper abdomen  You have knee or hip pain and discomfort while walking  You have symptoms of diabetes, such as intense hunger and thirst, and frequent urination  You have symptoms of sleep apnea, such as snoring or daytime sleepiness      You have questions or concerns about your condition or care     Treatment for obesity  focuses on helping you lose weight to improve your health  Even a small decrease in BMI can reduce the risk for many health problems  Your healthcare provider will help you set a weight-loss goal   Lifestyle changes  are the first step in treating obesity  These include making healthy food choices and getting regular physical activity  Your healthcare provider may suggest a weight-loss program that involves coaching, education, and therapy  Medicine  may help you lose weight when it is used with a healthy foods and physical activity  Surgery  can help you lose weight if you are very obese and have other health problems  There are several types of weight-loss surgery  Ask your healthcare provider for more information  Tips for safe weight loss:   Set small, realistic goals  An example of a small goal is to walk for 20 minutes 5 days a week  Anther goal is to lose 5% of your body weight  Tell friends, family members, and coworkers about your goals  and ask for their support  Ask a friend to lose weight with you, or join a weight-loss support group  Identify foods or triggers that may cause you to overeat , and find ways to avoid them  Remove tempting high-calorie foods from your home and workplace  Place a bowl of fresh fruit on your kitchen counter  If stress causes you to eat, then find other ways to cope with stress  A counselor or therapist may be able to help you  Keep a diary to track what you eat and drink  Also write down how many minutes of physical activity you do each day  Weigh yourself once a week and record it in your diary  Eating changes: You will need to eat 500 to 1,000 fewer calories each day than you currently eat to lose 1 to 2 pounds a week  The following changes will help you cut calories:  Eat smaller portions  Use small plates, no larger than 9 inches in diameter  Fill your plate half full of fruits and vegetables   Measure your food using measuring cups until you know what a serving size looks like  Eat 3 meals and 1 or 2 snacks each day  Plan your meals in advance  Reta Restrepo and eat at home most of the time  Eat slowly  Do not skip meals  Skipping meals can lead to overeating later in the day  This can make it harder for you to lose weight  Talk with a dietitian to help you make a meal plan and schedule that is right for you  Eat fruits and vegetables at every meal   They are low in calories and high in fiber, which makes you feel full  Do not add butter, margarine, or cream sauce to vegetables  Use herbs to season steamed vegetables  Eat less fat and fewer fried foods  Eat more baked or grilled chicken and fish  These protein sources are lower in calories and fat than red meat  Limit fast food  Dress your salads with olive oil and vinegar instead of bottled dressing  Limit the amount of sugar you eat  Do not drink sugary beverages  Limit alcohol  Activity changes:  Physical activity is good for your body in many ways  It helps you burn calories and build strong muscles  It decreases stress and depression, and improves your mood  It can also help you sleep better  Talk to your healthcare provider before you begin an exercise program   Exercise for at least 30 minutes 5 days a week  Start slowly  Set aside time each day for physical activity that you enjoy and that is convenient for you  It is best to do both weight training and an activity that increases your heart rate, such as walking, bicycling, or swimming  Find ways to be more active  Do yard work and housecleaning  Walk up the stairs instead of using elevators  Spend your leisure time going to events that require walking, such as outdoor festivals or fairs  This extra physical activity can help you lose weight and keep it off  Follow up with your doctor as directed: You may need to meet with a dietitian   Write down your questions so you remember to ask them during your visits  © Copyright [employer] 2022 Information is for End User's use only and may not be sold, redistributed or otherwise used for commercial purposes  All illustrations and images included in CareNotes® are the copyrighted property of [employer]  or Paul Manley  The above information is an  only  It is not intended as medical advice for individual conditions or treatments  Talk to your doctor, nurse or pharmacist before following any medical regimen to see if it is safe and effective for you  Problem List Items Addressed This Visit       Asthma     Asthma doing quite well with Singulair  Continue  Recommend that he have albuterol on hand  Prescription will be provided so that he can fill it at the pharmacy when he needs it  Recommend flu shot in the fall, COVID vaccination           Relevant Medications    montelukast (Singulair) 10 mg tablet    albuterol (Ventolin HFA) 90 mcg/act inhaler    Gastroesophageal reflux disease without esophagitis     Stable with Nexium  Renewed  Relevant Medications    esomeprazole (NexIUM) 40 MG capsule    Obesity (BMI 30 0-34  9)     BMI minimally elevated  Patient is limiting carbs, as well as increasing his exercise  Other Visit Diagnoses       Annual physical exam    -  Primary    Normal physical       Screen for colon cancer        Cologuard    Relevant Orders    Cologuard            COVID 19 Instructions    [address] was advised to limit contact with others to essential tasks such as getting food, medications, and medical care  Proper handwashing reviewed, and Hand sanitzer when washing is not available  If the patient develops symptoms of COVID 19, the patient should call the office as soon as possible  For 0757-3259 Flu season, it is strongly recommended that Flu Vaccinations be obtained        Virtual Visits:  [place]: This works on smart phones (any phone with Internet browsing capability)  You should get a text message when the provider is ready to see you  Click on the link in the text message, and the call should start  You will need to type in your name, and allow camera and microphone access  This is HIPPA compliant, and secure  If you have not already done so, get immunized to COVID 19  We are committed to getting you vaccinated as soon as possible and will be closely following CDC and [facility]  guidelines as they are released and revised  Please refer to our COVID-19 vaccine webpage for the most up to date information on the vaccine and our distribution efforts  This site will also have the most up to date recommendations for COVID booster vaccine  [employer] tn    Call [phone] ([phone]), option 7    OUR NEW LOCATION:    [address] [place], [place], [address]  Fax: [phone]    Lab services and OB/GYN are at this location as well

## 2022-08-17 NOTE — ASSESSMENT & PLAN NOTE
Asthma doing quite well with Singulair  Continue  Recommend that he have albuterol on hand  Prescription will be provided so that he can fill it at the pharmacy when he needs it    Recommend flu shot in the fall, COVID vaccination

## 2022-08-17 NOTE — PROGRESS NOTES
ADULT ANNUAL [facility] PRIMARY CARE    NAME: Yoan Moore  AGE: 48 y o  SEX: male  : 1975     DATE: 2022     Assessment and Plan:     Problem List Items Addressed This Visit     Asthma     Asthma doing quite well with Singulair  Continue  Recommend that he have albuterol on hand  Prescription will be provided so that he can fill it at the pharmacy when he needs it  Recommend flu shot in the fall, COVID vaccination         Relevant Medications    montelukast (Singulair) 10 mg tablet    albuterol (Ventolin HFA) 90 mcg/act inhaler    Gastroesophageal reflux disease without esophagitis     Stable with Nexium  Renewed  Relevant Medications    esomeprazole (NexIUM) 40 MG capsule    Obesity (BMI 30 0-34  9)     BMI minimally elevated  Patient is limiting carbs, as well as increasing his exercise  Other Visit Diagnoses     Annual physical exam    -  Primary    Normal physical       Screen for colon cancer        Cologuard    Relevant Orders    Cologuard          Immunizations and preventive care screenings were discussed with patient today  Appropriate education was printed on patient's after visit summary  Counseling:  Alcohol/drug use: discussed moderation in alcohol intake, the recommendations for healthy alcohol use, and avoidance of illicit drug use  Dental Health: discussed importance of regular tooth brushing, flossing, and dental visits  Injury prevention: discussed safety/seat belts, safety helmets, smoke detectors, carbon dioxide detectors, and smoking near bedding or upholstery  Sexual health: discussed sexually transmitted diseases, partner selection, use of condoms, avoidance of unintended pregnancy, and contraceptive alternatives  Exercise: the importance of regular exercise/physical activity was discussed  Recommend exercise 3-5 times per week for at least 30 minutes  BMI Counseling:  Body mass index is 33 93 kg/m²  The BMI is above normal  Nutrition recommendations include decreasing portion sizes, encouraging healthy choices of fruits and vegetables, decreasing fast food intake, consuming healthier snacks, limiting drinks that contain sugar, moderation in carbohydrate intake, increasing intake of lean protein, reducing intake of saturated and trans fat and reducing intake of cholesterol  Exercise recommendations include exercising 3-5 times per week  No pharmacotherapy was ordered  Rationale for BMI follow-up plan is due to patient being overweight or obese  Depression Screening and Follow-up Plan: Patient was screened for depression during today's encounter  They screened negative with a PHQ-2 score of 0  Return in about 6 months (around 2/17/2023)  Chief Complaint:     Chief Complaint   Patient presents with    Establish Care      History of Present Illness:     Patient does have history of asthma, as well as reflux  Has been doing relatively well with these  As far as asthma is concerned, he rarely if ever needs to use albuterol  He does use Singulair as a controller  In the past, he was on Flovent, but he has not been taking that recently  Reflux:  Takes omeprazole  Does not seem to be particularly terrible for him  He just noticed that it there  When he takes the omeprazole, he does quite well  Denies any other specific issues at the moment  Adult Annual Physical   Patient here for a comprehensive physical exam  The patient reports no problems  Diet and Physical Activity  Diet/Nutrition: well balanced diet, limited junk food, consuming 3-5 servings of fruits/vegetables daily and adequate fiber intake  Exercise: walking, 5-7 times a week on average and 30-60 minutes on average        Depression Screening  PHQ-2/9 Depression Screening    Little interest or pleasure in doing things: 0 - not at all  Feeling down, depressed, or hopeless: 0 - not at all  PHQ-2 Score: 0  PHQ-2 Interpretation: Negative depression screen       General Health  Sleep: sleeps well  Hearing: normal - bilateral   Vision: wears glasses  Dental: regular dental visits   Health  Symptoms include: none     Review of Systems:     Review of Systems   Constitutional: Negative  HENT: Negative  Eyes: Negative  Respiratory: Negative  Cardiovascular: Negative  Gastrointestinal: Negative  Endocrine: Negative  Genitourinary: Negative  Musculoskeletal: Negative  Skin: Negative  Allergic/Immunologic: Negative  Neurological: Negative  Hematological: Negative  Psychiatric/Behavioral: Negative  Past Medical History:     Past Medical History:   Diagnosis Date    Allergic     Asthma     Hyperlipidemia     Hypertension     Obesity     Pre-diabetes       Past Surgical History:     Past Surgical History:   Procedure Laterality Date    APPENDECTOMY        Family History:     Family History   Problem Relation Age of Onset    Asthma Mother     Hypertension Father       Social History:     Social History     Socioeconomic History    Marital status: /Civil Union     Spouse name: None    Number of children: None    Years of education: None    Highest education level: None   Occupational History    None   Tobacco Use    Smoking status: Never Smoker    Smokeless tobacco: Never Used   Vaping Use    Vaping Use: Never used   Substance and Sexual Activity    Alcohol use:  Yes    Drug use: No    Sexual activity: Yes     Partners: Female   Other Topics Concern    None   Social History Narrative    None     Social Determinants of Health     Financial Resource Strain: Not on file   Food Insecurity: Not on file   Transportation Needs: Not on file   Physical Activity: Not on file   Stress: Not on file   Social Connections: Not on file   Intimate Partner Violence: Not on file   Housing Stability: Not on file      Current Medications:     Current Outpatient Medications   Medication Sig Dispense Refill    albuterol (Ventolin HFA) 90 mcg/act inhaler Inhale 2 puffs every 6 (six) hours as needed for wheezing 18 g 5    esomeprazole (NexIUM) 40 MG capsule Take 1 capsule (40 mg total) by mouth daily 90 capsule 3    montelukast (Singulair) 10 mg tablet Take 1 tablet (10 mg total) by mouth daily at bedtime 90 tablet 3     No current facility-administered medications for this visit  Allergies:     No Known Allergies   Physical Exam:     /90 (BP Location: Left arm, Patient Position: Sitting, Cuff Size: Large)   Pulse 79   Temp (!) 97 1 °F (36 2 °C) (Temporal)   Ht 5' 11 25" (1 81 m)   Wt 111 kg (245 lb)   SpO2 98%   BMI 33 93 kg/m²     Physical Exam  Vitals and nursing note reviewed  Exam conducted with a chaperone present  Constitutional:       General: He is not in acute distress  Appearance: Normal appearance  He is normal weight  He is not ill-appearing, toxic-appearing or diaphoretic  HENT:      Head: Normocephalic  Right Ear: Tympanic membrane, ear canal and external ear normal  There is no impacted cerumen  Left Ear: Tympanic membrane, ear canal and external ear normal  There is no impacted cerumen  Nose: Nose normal    Eyes:      General: No scleral icterus  Right eye: No discharge  Left eye: No discharge  Extraocular Movements: Extraocular movements intact  Conjunctiva/sclera: Conjunctivae normal       Pupils: Pupils are equal, round, and reactive to light  Cardiovascular:      Rate and Rhythm: Normal rate and regular rhythm  Pulses: Normal pulses  Heart sounds: No murmur heard  No friction rub  No gallop  Pulmonary:      Effort: Pulmonary effort is normal  No respiratory distress  Breath sounds: Normal breath sounds  No stridor  No wheezing, rhonchi or rales  Abdominal:      General: Abdomen is flat  Bowel sounds are normal  There is no distension  Palpations:  There is no mass       Tenderness: There is no abdominal tenderness  There is no guarding or rebound  Musculoskeletal:         General: Normal range of motion  Cervical back: Normal range of motion  Skin:     General: Skin is warm and dry  Capillary Refill: Capillary refill takes less than 2 seconds  Coloration: Skin is not jaundiced  Findings: No bruising or erythema  Neurological:      General: No focal deficit present  Mental Status: He is alert and oriented to person, place, and time  Mental status is at baseline  Cranial Nerves: No cranial nerve deficit  Sensory: No sensory deficit  Motor: No weakness  Coordination: Coordination normal       Gait: Gait normal       Deep Tendon Reflexes: Reflexes normal    Psychiatric:         Mood and Affect: Mood normal          Behavior: Behavior normal          Thought Content:  Thought content normal          Judgment: Judgment normal           Ballard Bence, MD  [address]

## 2022-09-14 ENCOUNTER — APPOINTMENT (OUTPATIENT)
Dept: LAB | Facility: HOSPITAL | Age: 47
End: 2022-09-14

## 2022-09-14 DIAGNOSIS — Z00.8 ENCOUNTER FOR OTHER GENERAL EXAMINATION: ICD-10-CM

## 2022-09-14 LAB
CHOLEST SERPL-MCNC: 198 MG/DL
HDLC SERPL-MCNC: 41 MG/DL
LDLC SERPL CALC-MCNC: 136 MG/DL (ref 0–100)
NONHDLC SERPL-MCNC: 157 MG/DL
TRIGL SERPL-MCNC: 104 MG/DL

## 2022-09-14 PROCEDURE — 36415 COLL VENOUS BLD VENIPUNCTURE: CPT

## 2022-09-14 PROCEDURE — 80061 LIPID PANEL: CPT

## 2022-09-14 PROCEDURE — 83036 HEMOGLOBIN GLYCOSYLATED A1C: CPT

## 2022-09-15 LAB
EST. AVERAGE GLUCOSE BLD GHB EST-MCNC: 126 MG/DL
HBA1C MFR BLD: 6 %

## 2022-11-29 ENCOUNTER — CLINICAL SUPPORT (OUTPATIENT)
Dept: FAMILY MEDICINE CLINIC | Facility: CLINIC | Age: 47
End: 2022-11-29

## 2022-11-29 DIAGNOSIS — Z23 NEED FOR INFLUENZA VACCINATION: Primary | ICD-10-CM

## 2023-01-05 ENCOUNTER — OFFICE VISIT (OUTPATIENT)
Dept: FAMILY MEDICINE CLINIC | Facility: CLINIC | Age: 48
End: 2023-01-05

## 2023-01-05 VITALS
SYSTOLIC BLOOD PRESSURE: 122 MMHG | TEMPERATURE: 96.7 F | HEART RATE: 88 BPM | DIASTOLIC BLOOD PRESSURE: 80 MMHG | RESPIRATION RATE: 16 BRPM | WEIGHT: 255.8 LBS | BODY MASS INDEX: 35.81 KG/M2 | OXYGEN SATURATION: 96 % | HEIGHT: 71 IN

## 2023-01-05 DIAGNOSIS — J45.40 MODERATE PERSISTENT ASTHMA WITHOUT COMPLICATION: ICD-10-CM

## 2023-01-05 DIAGNOSIS — H10.33 ACUTE BACTERIAL CONJUNCTIVITIS OF BOTH EYES: Primary | ICD-10-CM

## 2023-01-05 RX ORDER — POLYMYXIN B SULFATE AND TRIMETHOPRIM 1; 10000 MG/ML; [USP'U]/ML
1 SOLUTION OPHTHALMIC EVERY 4 HOURS
Qty: 10 ML | Refills: 0 | Status: SHIPPED | OUTPATIENT
Start: 2023-01-05

## 2023-01-05 NOTE — ASSESSMENT & PLAN NOTE
Polytrim drops were ordered to be used every 4 hours while awake over the next 5 days  Patient reports that he does have Zyrtec at home so he was advised to take this daily to help with nasal congestion

## 2023-01-05 NOTE — PROGRESS NOTES
Name: Obie Gay      : 1975      MRN: [id number]  Encounter Provider: LUIS Mckeon  Encounter Date: 2023   Encounter department: [place] [address]     1  Acute bacterial conjunctivitis of both eyes  Assessment & Plan:  Polytrim drops were ordered to be used every 4 hours while awake over the next 5 days  Patient reports that he does have Zyrtec at home so he was advised to take this daily to help with nasal congestion  Orders:  -     polymyxin b-trimethoprim (POLYTRIM) ophthalmic solution; Administer 1 drop to both eyes every 4 (four) hours    2  Moderate persistent asthma without complication  Assessment & Plan:  No current issues regarding this  BMI Counseling: Body mass index is 35 43 kg/m²  The BMI is above normal  Nutrition recommendations include decreasing portion sizes, encouraging healthy choices of fruits and vegetables, moderation in carbohydrate intake and increasing intake of lean protein  Exercise recommendations include exercising 3-5 times per week and obtaining a gym membership  No pharmacotherapy was ordered  Rationale for BMI follow-up plan is due to patient being overweight or obese  Subjective      Bilateral conjunctivitis: Patient reports that he woke this morning and noted a yellow crusty discharge in his bilateral eyes  He also noted redness of his bilateral conjunctiva  The patient reports some irritation of his eyes but denies any pain or vision loss  Patient also reports he has been experiencing some congestion but denies any other current URI symptoms  Asthma: Well-controlled with at bedtime use of Singulair and as needed use of albuterol inhaler  The patient denies any current shortness of breath and states he has not needed to use his albuterol inhaler lately  Review of Systems   Constitutional: Negative for chills and fever  HENT: Positive for congestion   Negative for ear pain, postnasal drip, rhinorrhea, sinus pressure, sinus pain and sore throat  Eyes: Positive for discharge (bilateral-yellow crusty) and redness (bilateral)  Negative for photophobia, pain, itching and visual disturbance  Respiratory: Negative for cough, chest tightness, shortness of breath and wheezing  Cardiovascular: Negative for chest pain, palpitations and leg swelling  Gastrointestinal: Negative for abdominal pain, constipation, diarrhea, nausea and vomiting  Endocrine: Negative for cold intolerance and heat intolerance  Genitourinary: Negative for decreased urine volume, dysuria and hematuria  Musculoskeletal: Negative for arthralgias, back pain and myalgias  Skin: Negative for color change and rash  Allergic/Immunologic: Negative for environmental allergies  Neurological: Negative for dizziness, seizures, syncope, weakness, light-headedness, numbness and headaches  Hematological: Negative for adenopathy  Psychiatric/Behavioral: Negative for confusion  The patient is not nervous/anxious  All other systems reviewed and are negative  Current Outpatient Medications on File Prior to Visit   Medication Sig   • albuterol (Ventolin HFA) 90 mcg/act inhaler Inhale 2 puffs every 6 (six) hours as needed for wheezing   • esomeprazole (NexIUM) 40 MG capsule Take 1 capsule (40 mg total) by mouth daily   • montelukast (Singulair) 10 mg tablet Take 1 tablet (10 mg total) by mouth daily at bedtime       Objective     /80 (BP Location: Right arm, Patient Position: Sitting, Cuff Size: Large)   Pulse 88   Temp (!) 96 7 °F (35 9 °C) (Tympanic)   Resp 16   Ht 5' 11 25" (1 81 m)   Wt 116 kg (255 lb 12 8 oz)   SpO2 96%   BMI 35 43 kg/m²     Physical Exam  Vitals and nursing note reviewed  Constitutional:       General: He is not in acute distress  Appearance: Normal appearance  He is not ill-appearing  HENT:      Head: Normocephalic        Right Ear: Hearing and tympanic membrane normal       Left Ear: Hearing and tympanic membrane normal       Mouth/Throat:      Pharynx: No pharyngeal swelling, oropharyngeal exudate, posterior oropharyngeal erythema or uvula swelling  Tonsils: No tonsillar exudate or tonsillar abscesses  Eyes:      Conjunctiva/sclera:      Right eye: Right conjunctiva is injected  Exudate (yellow) present  Left eye: Left conjunctiva is injected  Exudate (yellow) present  Cardiovascular:      Rate and Rhythm: Normal rate and regular rhythm  Pulses: Normal pulses  Carotid pulses are 2+ on the right side and 2+ on the left side  Radial pulses are 2+ on the right side and 2+ on the left side  Posterior tibial pulses are 2+ on the right side and 2+ on the left side  Heart sounds: Normal heart sounds  No murmur heard  Pulmonary:      Effort: Pulmonary effort is normal  No respiratory distress  Breath sounds: Normal breath sounds  No decreased breath sounds, wheezing, rhonchi or rales  Abdominal:      General: Abdomen is flat  Bowel sounds are normal  There is no distension  Palpations: Abdomen is soft  Tenderness: There is no abdominal tenderness  There is no guarding  Musculoskeletal:         General: Normal range of motion  Cervical back: Normal range of motion  Right lower leg: No edema  Left lower leg: No edema  Skin:     General: Skin is warm and dry  Capillary Refill: Capillary refill takes less than 2 seconds  Neurological:      General: No focal deficit present  Mental Status: He is alert and oriented to person, place, and time  Psychiatric:         Mood and Affect: Mood normal          Behavior: Behavior normal          Thought Content:  Thought content normal          Judgment: Judgment normal        LUIS Sim

## 2023-01-28 ENCOUNTER — OFFICE VISIT (OUTPATIENT)
Dept: FAMILY MEDICINE CLINIC | Facility: CLINIC | Age: 48
End: 2023-01-28

## 2023-01-28 VITALS
SYSTOLIC BLOOD PRESSURE: 128 MMHG | WEIGHT: 254 LBS | HEART RATE: 69 BPM | HEIGHT: 71 IN | BODY MASS INDEX: 35.56 KG/M2 | DIASTOLIC BLOOD PRESSURE: 88 MMHG | TEMPERATURE: 98 F

## 2023-01-28 DIAGNOSIS — K21.9 GASTROESOPHAGEAL REFLUX DISEASE WITHOUT ESOPHAGITIS: ICD-10-CM

## 2023-01-28 DIAGNOSIS — J45.40 MODERATE PERSISTENT ASTHMA WITHOUT COMPLICATION: Primary | ICD-10-CM

## 2023-01-28 RX ORDER — FLUTICASONE FUROATE 100 UG/1
1 POWDER RESPIRATORY (INHALATION) DAILY
Qty: 30 BLISTER | Refills: 0 | Status: SHIPPED | OUTPATIENT
Start: 2023-01-28 | End: 2023-01-30 | Stop reason: SDUPTHER

## 2023-01-28 NOTE — ASSESSMENT & PLAN NOTE
Patient does continue on Nexium daily  Patient does report that if he will forget the Nexium on occasion, he does have symptoms that recur after several days  At some point, consider trial of famotidine

## 2023-01-28 NOTE — PROGRESS NOTES
Name: Lacy Parada      : 1975      MRN: [id number]  Encounter Provider: Alberto Wilson MD  Encounter Date: 2023   Encounter department: [place] [address]  Moderate persistent asthma without complication  Assessment & Plan:  Patient is having some increasing problems with asthma of late  Likely secondary to recent infection  Will add Arnuity for short-term, i e  1 month  1 inhalation once a day  This reduces inflammation, and should therefore help out with decreasing some of the coughing and irritation  Continue on albuterol as needed, continue Singulair  Orders:  -     fluticasone (Arnuity Ellipta) 100 MCG/ACT AEPB inhaler; Inhale 1 puff daily Rinse mouth after use  2  Gastroesophageal reflux disease without esophagitis  Assessment & Plan:  Patient does continue on Nexium daily  Patient does report that if he will forget the Nexium on occasion, he does have symptoms that recur after several days  At some point, consider trial of famotidine  Subjective      Patient is here today to review asthma  Lately, he has been having more problems with his asthma, with coughing, wheezing  Patient is continued on Singulair 10 mg, as well as albuterol as needed  Had uri about a month ago, and noted increased wheezes and SOB since this  Using Albuterol much more of late  Patient reports that today he states he is feeling much better  Review of Systems   Constitutional: Negative  HENT: Negative  Eyes: Negative  Respiratory: Negative  Cardiovascular: Negative  Gastrointestinal: Negative  Endocrine: Negative  Genitourinary: Negative  Musculoskeletal: Negative  Skin: Negative  Allergic/Immunologic: Negative  Neurological: Negative  Hematological: Negative  Psychiatric/Behavioral: Negative          Current Outpatient Medications on File Prior to Visit   Medication Sig   • albuterol (Ventolin HFA) 90 mcg/act inhaler Inhale 2 puffs every 6 (six) hours as needed for wheezing   • esomeprazole (NexIUM) 40 MG capsule Take 1 capsule (40 mg total) by mouth daily   • montelukast (Singulair) 10 mg tablet Take 1 tablet (10 mg total) by mouth daily at bedtime   • [DISCONTINUED] polymyxin b-trimethoprim (POLYTRIM) ophthalmic solution Administer 1 drop to both eyes every 4 (four) hours       Objective     /88 (BP Location: Right arm, Patient Position: Sitting, Cuff Size: Adult)   Pulse 69   Temp 98 °F (36 7 °C) (Temporal)   Ht 5' 11 25" (1 81 m) Comment: on record  Wt 115 kg (254 lb)   BMI 35 18 kg/m²     Physical Exam  Vitals and nursing note reviewed  Constitutional:       Appearance: Normal appearance  He is well-developed  HENT:      Head: Normocephalic and atraumatic  Cardiovascular:      Rate and Rhythm: Normal rate and regular rhythm  Pulses:           Carotid pulses are 2+ on the right side and 2+ on the left side  Heart sounds: Normal heart sounds  No murmur heard  No friction rub  No gallop  Pulmonary:      Effort: Pulmonary effort is normal  No respiratory distress  Breath sounds: Normal breath sounds  No wheezing or rales  Musculoskeletal:      Cervical back: Normal range of motion and neck supple  Neurological:      Mental Status: He is alert         Fahad Pretty MD

## 2023-01-28 NOTE — PATIENT INSTRUCTIONS
1  Moderate persistent asthma without complication  Assessment & Plan:  Patient is having some increasing problems with asthma of late  Likely secondary to recent infection  Will add Arnuity for short-term, i e  1 month  1 inhalation once a day  This reduces inflammation, and should therefore help out with decreasing some of the coughing and irritation  Continue on albuterol as needed, continue Singulair  Orders:  -     fluticasone (Arnuity Ellipta) 100 MCG/ACT AEPB inhaler; Inhale 1 puff daily Rinse mouth after use  2  Gastroesophageal reflux disease without esophagitis  Assessment & Plan:  Patient does continue on Nexium daily  Patient does report that if he will forget the Nexium on occasion, he does have symptoms that recur after several days  At some point, consider trial of famotidine  COVID 19 Instructions    [address] was advised to limit contact with others to essential tasks such as getting food, medications, and medical care  Proper handwashing reviewed, and Hand sanitzer when washing is not available  If the patient develops symptoms of COVID 19, the patient should call the office as soon as possible  For 6561-5462 Flu season, it is strongly recommended that Flu Vaccinations be obtained  Virtual Visits:  Joselo: This works on smart phones (any phone with Internet browsing capability)  You should get a text message when the provider is ready to see you  Click on the link in the text message, and the call should start  You will need to type in your name, and allow camera and microphone access  This is HIPPA compliant, and secure  If you have not already done so, get immunized to COVID 19  We are committed to getting you vaccinated as soon as possible and will be closely following CDC and [facility]  guidelines as they are released and revised    Please refer to our COVID-19 vaccine webpage for the most up to date information on the vaccine and our distribution efforts  This site will also have the most up to date recommendations for COVID booster vaccine  Manohar cavanaugh    Call [phone] ([phone]), option 7    OUR NEW LOCATION:    [address], [address], [place], [address]  Fax: [phone]    Lab services and OB/GYN are at this location as well

## 2023-01-28 NOTE — ASSESSMENT & PLAN NOTE
Patient is having some increasing problems with asthma of late  Likely secondary to recent infection  Will add Arnuity for short-term, i e  1 month  1 inhalation once a day  This reduces inflammation, and should therefore help out with decreasing some of the coughing and irritation  Continue on albuterol as needed, continue Singulair

## 2023-01-30 DIAGNOSIS — J45.40 MODERATE PERSISTENT ASTHMA WITHOUT COMPLICATION: ICD-10-CM

## 2023-01-30 RX ORDER — FLUTICASONE FUROATE 100 UG/1
1 POWDER RESPIRATORY (INHALATION) DAILY
Qty: 30 BLISTER | Refills: 0 | Status: SHIPPED | OUTPATIENT
Start: 2023-01-30 | End: 2023-03-01

## 2023-01-31 ENCOUNTER — OFFICE VISIT (OUTPATIENT)
Dept: FAMILY MEDICINE CLINIC | Facility: CLINIC | Age: 48
End: 2023-01-31

## 2023-01-31 VITALS
HEIGHT: 72 IN | SYSTOLIC BLOOD PRESSURE: 116 MMHG | DIASTOLIC BLOOD PRESSURE: 72 MMHG | WEIGHT: 255 LBS | BODY MASS INDEX: 34.54 KG/M2 | HEART RATE: 121 BPM | OXYGEN SATURATION: 96 %

## 2023-01-31 DIAGNOSIS — J45.41 MODERATE PERSISTENT ASTHMA WITH ACUTE EXACERBATION: Primary | ICD-10-CM

## 2023-01-31 RX ORDER — AZITHROMYCIN 250 MG/1
TABLET, FILM COATED ORAL
Qty: 6 TABLET | Refills: 0 | Status: SHIPPED | OUTPATIENT
Start: 2023-01-31 | End: 2023-02-05

## 2023-01-31 RX ORDER — PREDNISONE 10 MG/1
TABLET ORAL
Qty: 50 TABLET | Refills: 0 | Status: SHIPPED | OUTPATIENT
Start: 2023-01-31

## 2023-01-31 NOTE — PATIENT INSTRUCTIONS
1  Moderate persistent asthma with acute exacerbation  Assessment & Plan:  Patient appears to be having worsening asthma symptoms at this point  Unfortunately, Arnuity was not sufficient to take care of it from the weekend  Continue with albuterol  He did use a nebulizer treatment just before he came here, and is not wheezing but certainly is coughing a significant amount  Trial of the prednisone as this should decrease inflammation significantly  Continue with Arnuity and albuterol  Add Zithromax to cover for infection  Orders:  -     azithromycin (ZITHROMAX) 250 mg tablet; Take 2 tablets on day 1, then 1 tablet daily days 2 through 5  -     predniSONE 10 mg tablet; Take 3 BID for 3 days then 2 BID for 3 days then 1 BID for 3 days then 1 QD for 3 days then 1/2 QD for 3 days then stop        COVID 19 Instructions    Rufus Rosario was advised to limit contact with others to essential tasks such as getting food, medications, and medical care  Proper handwashing reviewed, and Hand sanitzer when washing is not available  If the patient develops symptoms of COVID 19, the patient should call the office as soon as possible  For 3564-8831 Flu season, it is strongly recommended that Flu Vaccinations be obtained  Virtual Visits:  Joselo: This works on smart phones (any phone with Internet browsing capability)  You should get a text message when the provider is ready to see you  Click on the link in the text message, and the call should start  You will need to type in your name, and allow camera and microphone access  This is HIPPA compliant, and secure  If you have not already done so, get immunized to COVID 19  We are committed to getting you vaccinated as soon as possible and will be closely following CDC and [facility]  guidelines as they are released and revised    Please refer to our COVID-19 vaccine webpage for the most up to date information on the vaccine and our distribution efforts  This site will also have the most up to date recommendations for COVID booster vaccine  Manohar cavanaugh    Call [phone] ([phone]), option 7    OUR NEW LOCATION:    [address], [facility] [address], [place], [address]  Fax: [phone]    Lab services and OB/GYN are at this location as well

## 2023-01-31 NOTE — ASSESSMENT & PLAN NOTE
Patient appears to be having worsening asthma symptoms at this point  Unfortunately, Arnuity was not sufficient to take care of it from the weekend  Continue with albuterol  He did use a nebulizer treatment just before he came here, and is not wheezing but certainly is coughing a significant amount  Trial of the prednisone as this should decrease inflammation significantly  Continue with Arnuity and albuterol  Add Zithromax to cover for infection

## 2023-01-31 NOTE — PROGRESS NOTES
Name: Tanya Gorman      : 1975      MRN: [id number]  Encounter Provider: Vasile Hastings MD  Encounter Date: 2023   Encounter department: [place] [address]  Moderate persistent asthma with acute exacerbation  Assessment & Plan:  Patient appears to be having worsening asthma symptoms at this point  Unfortunately, Arnuity was not sufficient to take care of it from the weekend  Continue with albuterol  He did use a nebulizer treatment just before he came here, and is not wheezing but certainly is coughing a significant amount  Trial of the prednisone as this should decrease inflammation significantly  Continue with Arnuity and albuterol  Add Zithromax to cover for infection  Orders:  -     azithromycin (ZITHROMAX) 250 mg tablet; Take 2 tablets on day 1, then 1 tablet daily days 2 through 5  -     predniSONE 10 mg tablet; Take 3 BID for 3 days then 2 BID for 3 days then 1 BID for 3 days then 1 QD for 3 days then 1/2 QD for 3 days then stop        Depression Screening and Follow-up Plan: Patient was screened for depression during today's encounter  They screened negative with a PHQ-2 score of 0  Subjective      Patient is here to follow-up after his recent evaluation this weekend  Continues to have some wheezing and shortness of breath  He did start Arnuity, but it did not seem to make much difference at this point  Patient has been having increasing cough lately  Shortness of breath at times  Has needed to use nebulizers at home  This is been worse since the weekend  Review of Systems   Constitutional: Negative  HENT: Negative  Respiratory: Positive for cough, shortness of breath and wheezing          Current Outpatient Medications on File Prior to Visit   Medication Sig   • albuterol (Ventolin HFA) 90 mcg/act inhaler Inhale 2 puffs every 6 (six) hours as needed for wheezing   • esomeprazole (NexIUM) 40 MG capsule Take 1 capsule (40 mg total) by mouth daily   • fluticasone (Arnuity Ellipta) 100 MCG/ACT AEPB inhaler Inhale 1 puff daily Rinse mouth after use  • montelukast (Singulair) 10 mg tablet Take 1 tablet (10 mg total) by mouth daily at bedtime       Objective     /72 (BP Location: Left leg, Patient Position: Sitting, Cuff Size: Large)   Pulse (!) 121   Ht 5' 11 5" (1 816 m)   Wt 116 kg (255 lb)   SpO2 96%   BMI 35 07 kg/m²     Physical Exam  Vitals and nursing note reviewed  Constitutional:       Appearance: He is well-developed  HENT:      Head: Normocephalic and atraumatic  Cardiovascular:      Rate and Rhythm: Normal rate and regular rhythm  Pulses:           Carotid pulses are 2+ on the right side and 2+ on the left side  Heart sounds: Normal heart sounds  No murmur heard  No friction rub  No gallop  Pulmonary:      Effort: Respiratory distress present  Breath sounds: Wheezing present  No rales  Musculoskeletal:      Cervical back: Normal range of motion and neck supple         Claudia Jenkins MD

## 2023-01-31 NOTE — LETTER
January 31, 2023     Patient: Luli Beltre  YOB: 1975  Date of Visit: 1/31/2023      To Whom it May Concern:    Kaela Martins is under my professional care  Sherice Higgins was seen in my office on 1/31/2023  Sherice Higgins may return to work on 2Feb2023  If you have any questions or concerns, please don't hesitate to call           Sincerely,          Chana Fatima MD        CC: No Recipients

## 2023-03-06 PROBLEM — H10.33 ACUTE BACTERIAL CONJUNCTIVITIS OF BOTH EYES: Status: RESOLVED | Noted: 2023-01-05 | Resolved: 2023-03-06

## 2023-03-26 ENCOUNTER — APPOINTMENT (EMERGENCY)
Dept: CT IMAGING | Facility: HOSPITAL | Age: 48
End: 2023-03-26

## 2023-03-26 ENCOUNTER — HOSPITAL ENCOUNTER (EMERGENCY)
Facility: HOSPITAL | Age: 48
Discharge: HOME | End: 2023-03-26
Attending: EMERGENCY MEDICINE

## 2023-03-26 VITALS
RESPIRATION RATE: 18 BRPM | SYSTOLIC BLOOD PRESSURE: 135 MMHG | WEIGHT: 25.4 LBS | DIASTOLIC BLOOD PRESSURE: 87 MMHG | OXYGEN SATURATION: 96 % | HEART RATE: 90 BPM | TEMPERATURE: 97.4 F | BODY MASS INDEX: 3.49 KG/M2

## 2023-03-26 DIAGNOSIS — R42 VERTIGO: Primary | ICD-10-CM

## 2023-03-26 LAB
ANION GAP SERPL CALCULATED.3IONS-SCNC: 6 MMOL/L (ref 4–13)
BASOPHILS # BLD AUTO: 0.06 THOUSANDS/ÂΜL (ref 0–0.1)
BASOPHILS NFR BLD AUTO: 1 % (ref 0–1)
BUN SERPL-MCNC: 19 MG/DL (ref 5–25)
CALCIUM SERPL-MCNC: 9 MG/DL (ref 8.4–10.2)
CHLORIDE SERPL-SCNC: 107 MMOL/L (ref 96–108)
CO2 SERPL-SCNC: 27 MMOL/L (ref 21–32)
CREAT SERPL-MCNC: 0.96 MG/DL (ref 0.6–1.3)
EOSINOPHIL # BLD AUTO: 0.13 THOUSAND/ÂΜL (ref 0–0.61)
EOSINOPHIL NFR BLD AUTO: 2 % (ref 0–6)
ERYTHROCYTE [DISTWIDTH] IN BLOOD BY AUTOMATED COUNT: 13.1 % (ref 11.6–15.1)
GFR SERPL CREATININE-BSD FRML MDRD: 92 ML/MIN/1.73SQ M
GLUCOSE SERPL-MCNC: 136 MG/DL (ref 65–140)
HCT VFR BLD AUTO: 43.5 % (ref 36.5–49.3)
HGB BLD-MCNC: 14.5 G/DL (ref 12–17)
IMM GRANULOCYTES # BLD AUTO: 0.04 THOUSAND/UL (ref 0–0.2)
IMM GRANULOCYTES NFR BLD AUTO: 1 % (ref 0–2)
LYMPHOCYTES # BLD AUTO: 1.96 THOUSANDS/ÂΜL (ref 0.6–4.47)
LYMPHOCYTES NFR BLD AUTO: 23 % (ref 14–44)
MCH RBC QN AUTO: 28.9 PG (ref 26.8–34.3)
MCHC RBC AUTO-ENTMCNC: 33.3 G/DL (ref 31.4–37.4)
MCV RBC AUTO: 87 FL (ref 82–98)
MONOCYTES # BLD AUTO: 0.43 THOUSAND/ÂΜL (ref 0.17–1.22)
MONOCYTES NFR BLD AUTO: 5 % (ref 4–12)
NEUTROPHILS # BLD AUTO: 6.07 THOUSANDS/ÂΜL (ref 1.85–7.62)
NEUTS SEG NFR BLD AUTO: 68 % (ref 43–75)
NRBC BLD AUTO-RTO: 0 /100 WBCS
PLATELET # BLD AUTO: 176 THOUSANDS/UL (ref 149–390)
PMV BLD AUTO: 10.6 FL (ref 8.9–12.7)
POTASSIUM SERPL-SCNC: 4.1 MMOL/L (ref 3.5–5.3)
RBC # BLD AUTO: 5.01 MILLION/UL (ref 3.88–5.62)
SODIUM SERPL-SCNC: 140 MMOL/L (ref 135–147)
WBC # BLD AUTO: 8.69 THOUSAND/UL (ref 4.31–10.16)

## 2023-03-26 RX ORDER — DIAZEPAM 5 MG/1
5 TABLET ORAL EVERY 12 HOURS PRN
Qty: 7 TABLET | Refills: 0 | Status: SHIPPED | OUTPATIENT
Start: 2023-03-26 | End: 2023-03-26 | Stop reason: SDUPTHER

## 2023-03-26 RX ORDER — DIAZEPAM 5 MG/ML
2.5 INJECTION, SOLUTION INTRAMUSCULAR; INTRAVENOUS ONCE
Status: COMPLETED | OUTPATIENT
Start: 2023-03-26 | End: 2023-03-26

## 2023-03-26 RX ORDER — MECLIZINE HYDROCHLORIDE 25 MG/1
25 TABLET ORAL ONCE
Status: COMPLETED | OUTPATIENT
Start: 2023-03-26 | End: 2023-03-26

## 2023-03-26 RX ORDER — MECLIZINE HYDROCHLORIDE 25 MG/1
25 TABLET ORAL 3 TIMES DAILY PRN
Qty: 30 TABLET | Refills: 0 | Status: SHIPPED | OUTPATIENT
Start: 2023-03-26 | End: 2023-03-26 | Stop reason: SDUPTHER

## 2023-03-26 RX ORDER — DIAZEPAM 5 MG/1
5 TABLET ORAL EVERY 12 HOURS PRN
Qty: 7 TABLET | Refills: 0 | Status: SHIPPED | OUTPATIENT
Start: 2023-03-26

## 2023-03-26 RX ORDER — MECLIZINE HYDROCHLORIDE 25 MG/1
25 TABLET ORAL 3 TIMES DAILY PRN
Qty: 30 TABLET | Refills: 0 | Status: SHIPPED | OUTPATIENT
Start: 2023-03-26

## 2023-03-26 RX ORDER — PREDNISONE 20 MG/1
40 TABLET ORAL ONCE
Status: COMPLETED | OUTPATIENT
Start: 2023-03-26 | End: 2023-03-26

## 2023-03-26 RX ORDER — ONDANSETRON 2 MG/ML
4 INJECTION INTRAMUSCULAR; INTRAVENOUS ONCE
Status: COMPLETED | OUTPATIENT
Start: 2023-03-26 | End: 2023-03-26

## 2023-03-26 RX ADMIN — MECLIZINE HYDROCHLORIDE 25 MG: 25 TABLET ORAL at 12:36

## 2023-03-26 RX ADMIN — DIAZEPAM 2.5 MG: 5 INJECTION INTRAMUSCULAR; INTRAVENOUS at 13:39

## 2023-03-26 RX ADMIN — PREDNISONE 40 MG: 20 TABLET ORAL at 13:38

## 2023-03-26 RX ADMIN — IOHEXOL 85 ML: 350 INJECTION, SOLUTION INTRAVENOUS at 14:31

## 2023-03-26 RX ADMIN — ONDANSETRON 4 MG: 2 INJECTION INTRAMUSCULAR; INTRAVENOUS at 12:35

## 2023-03-26 RX ADMIN — DIAZEPAM 2.5 MG: 5 INJECTION INTRAMUSCULAR; INTRAVENOUS at 15:14

## 2023-03-26 RX ADMIN — SODIUM CHLORIDE 1000 ML: 0.9 INJECTION, SOLUTION INTRAVENOUS at 12:36

## 2023-03-26 NOTE — ED NOTES
Attempted to get patient oob  Upon sitting up and hanging legs off bed, pt became extremely dizzy and had to immediately lay back down  Dr Vickie Godfrey, RN  03/26/23 8166

## 2023-03-26 NOTE — ED PROVIDER NOTES
Final Diagnosis:  1  Vertigo        Chief Complaint   Patient presents with   • Dizziness     Pt c/o dizziness, nausea onset this AM  Pt reports he feels like the room is spinning  Pt reports hx of vertigo and says this feels similar     HPI  Patient presents for evaluation of dizziness  Patient states that an hour or so ago he was working on a motor vehicle and a piece of dirt fell in his ear  He then had the onset of sensation like the room was spinning  He then moved his ear around to help clear out the dirt and felt improved but then later on had a recurrence of the symptoms  Patient states that he does have a history of vertigo and has had to have maneuvers done in the past to help improve this  He states that he did have an episode of vomiting  Patient denies any head strike  No fever or chills  Patient states that his dizziness is worse when he moves his head around and is better at rest       Unless otherwise specified:  - No language barrier    - History obtained from patient  - There are no limitations to the history obtained  - Previous charting was reviewed    PMH:   has a past medical history of Allergic, Asthma, Hyperlipidemia, Hypertension, Obesity, and Pre-diabetes  PSH:   has a past surgical history that includes Appendectomy  ROS:  Review of Systems   - 13 point ROS was performed and all are normal unless stated in the history above  - Nursing note reviewed  Vitals reviewed  - Orders placed by myself and/or advanced practitioner / resident  PE:   Vitals:    03/26/23 1204 03/26/23 1206 03/26/23 1517   BP:  131/90 135/87   BP Location:  Right arm Right arm   Pulse:  78 90   Resp:  18 18   Temp:  (!) 97 4 °F (36 3 °C)    TempSrc:  Oral    SpO2:  97% 96%   Weight: 11 5 kg (25 lb 6 4 oz)       Vitals reviewed by me  Patient is nondistressed sitting in bed  No cranial nerve deficits  No persistent nystagmus  Sensation intact throughout      Unless otherwise specified above: General: VS reviewed  Appears in NAD    Head: Normocephalic, atraumatic  Eyes: EOM-I      ENT: Atraumatic external nose and ears  No drooling  Neck: No JVD  CV: No pallor noted  Lungs:   No tachypnea  No respiratory distress    Abdomen:  Soft, non-tender, non-distended    MSK:   No obvious deformity    Skin: Dry, intact  No obvious rash  Psychiatric/Behavioral: Appropriate mood and affect   Exam: deferred    Physical Exam     Procedures   A:  - Nursing note reviewed  ED Course as of 03/26/23 1600   Sun Mar 26, 2023   1318 Laboratory analysis unremarkable  Patient reassessed  No improvement in current symptoms  We will try other medications  1410 Patient felt improved with symptomatic management  Attempted to ambulate and had immediate recurrence of dizzy sensation  Will prefer with advanced imaging  CTA head and neck with and without contrast   Final Result      1  No acute intracranial CT abnormality  2   Unremarkable CT angiogram of the head and neck                             Workstation performed: [id number]           Orders Placed This Encounter   Procedures   • CTA head and neck with and without contrast   • CBC and differential   • Basic metabolic panel   • Insert peripheral IV     Labs Reviewed   CBC AND DIFFERENTIAL       Result Value Ref Range Status    WBC 8 69  4 31 - 10 16 Thousand/uL Final    RBC 5 01  3 88 - 5 62 Million/uL Final    Hemoglobin 14 5  12 0 - 17 0 g/dL Final    Hematocrit 43 5  36 5 - 49 3 % Final    MCV 87  82 - 98 fL Final    MCH 28 9  26 8 - 34 3 pg Final    MCHC 33 3  31 4 - 37 4 g/dL Final    RDW 13 1  11 6 - 15 1 % Final    MPV 10 6  8 9 - 12 7 fL Final    Platelets 177  232 - 390 Thousands/uL Final    nRBC 0  /100 WBCs Final    Neutrophils Relative 68  43 - 75 % Final    Immat GRANS % 1  0 - 2 % Final    Lymphocytes Relative 23  14 - 44 % Final    Monocytes Relative 5  4 - 12 % Final    Eosinophils Relative 2  0 - 6 % Final    Basophils Relative 1  0 - 1 % Final    Neutrophils Absolute 6 07  1 85 - 7 62 Thousands/µL Final    Immature Grans Absolute 0 04  0 00 - 0 20 Thousand/uL Final    Lymphocytes Absolute 1 96  0 60 - 4 47 Thousands/µL Final    Monocytes Absolute 0 43  0 17 - 1 22 Thousand/µL Final    Eosinophils Absolute 0 13  0 00 - 0 61 Thousand/µL Final    Basophils Absolute 0 06  0 00 - 0 10 Thousands/µL Final   BASIC METABOLIC PANEL    Sodium 531  135 - 147 mmol/L Final    Potassium 4 1  3 5 - 5 3 mmol/L Final    Chloride 107  96 - 108 mmol/L Final    CO2 27  21 - 32 mmol/L Final    ANION GAP 6  4 - 13 mmol/L Final    BUN 19  5 - 25 mg/dL Final    Creatinine 0 96  0 60 - 1 30 mg/dL Final    Comment: Standardized to IDMS reference method    Glucose 136  65 - 140 mg/dL Final    Comment: If the patient is fasting, the ADA then defines impaired fasting glucose as > 100 mg/dL and diabetes as > or equal to 123 mg/dL  Calcium 9 0  8 4 - 10 2 mg/dL Final    eGFR 92  ml/min/1 73sq m Final    Narrative:     [place] guidelines for Chronic Kidney Disease (CKD):   •  Stage 1 with normal or high GFR (GFR > 90 mL/min/1 73 square meters)  •  Stage 2 Mild CKD (GFR = 60-89 mL/min/1 73 square meters)  •  Stage 3A Moderate CKD (GFR = 45-59 mL/min/1 73 square meters)  •  Stage 3B Moderate CKD (GFR = 30-44 mL/min/1 73 square meters)  •  Stage 4 Severe CKD (GFR = 15-29 mL/min/1 73 square meters)  •  Stage 5 End Stage CKD (GFR <15 mL/min/1 73 square meters)  Note: GFR calculation is accurate only with a steady state creatinine         Final Diagnosis:  1  Vertigo        P:  -Based on patient's history and exam very low suspicion for acute intracranial pathology  Sounds like a peripheral vertigo  Will treat symptomatically, check electrolytes  Should patient's symptoms persist or worsen then will discuss further imaging   -Patient with persistent symptoms  CTA was performed which was unremarkable    I went back and discussed this with patient with those bedside  Discussed admission to hospital versus discharge home with symptomatic management  They would favor the latter and I am in agreement  We will provide both Valium as well as meclizine to help control symptoms  Return precautions reviewed  Unless otherwise noted the patient's medications were reviewed and they should continue as directed  Medications   sodium chloride 0 9 % bolus 1,000 mL (0 mL Intravenous Stopped 3/26/23 1341)   meclizine (ANTIVERT) tablet 25 mg (25 mg Oral Given 3/26/23 1236)   ondansetron (ZOFRAN) injection 4 mg (4 mg Intravenous Given 3/26/23 1235)   predniSONE tablet 40 mg (40 mg Oral Given 3/26/23 1338)   diazepam (VALIUM) injection 2 5 mg (2 5 mg Intravenous Given 3/26/23 1339)   diazepam (VALIUM) injection 2 5 mg (2 5 mg Intravenous Given 3/26/23 1514)   iohexol (OMNIPAQUE) 350 MG/ML injection (SINGLE-DOSE) 85 mL (85 mL Intravenous Given 3/26/23 1431)     Time reflects when diagnosis was documented in both MDM as applicable and the Disposition within this note     Time User Action Codes Description Comment    3/26/2023  3:28 PM Carrie Briceno Add [R42] Vertigo       ED Disposition     ED Disposition   Discharge    Condition   Stable    Date/Time   Sun Mar 26, 2023  3:28 PM    [address] discharge to home/self care                 Follow-up Information     Follow up With Specialties Details Why [address] Za Hoskins MD Family Medicine   [facility] [address] [phone]          Discharge Medication List as of 3/26/2023  3:29 PM      START taking these medications    Details   diazepam (VALIUM) 5 mg tablet Take 1 tablet (5 mg total) by mouth every 12 (twelve) hours as needed (Vertigo) for up to 7 doses, Starting Sun 3/26/2023, Normal      meclizine (ANTIVERT) 25 mg tablet Take 1 tablet (25 mg total) by mouth 3 (three) times a day as needed for dizziness, Starting Sun 3/26/2023, "Normal         CONTINUE these medications which have NOT CHANGED    Details   albuterol (Ventolin HFA) 90 mcg/act inhaler Inhale 2 puffs every 6 (six) hours as needed for wheezing, Starting Wed 8/17/2022, Print      esomeprazole (NexIUM) 40 MG capsule Take 1 capsule (40 mg total) by mouth daily, Starting Wed 8/17/2022, Normal      fluticasone (Arnuity Ellipta) 100 MCG/ACT AEPB inhaler Inhale 1 puff daily Rinse mouth after use , Starting Mon 1/30/2023, Until Wed 3/1/2023, Phone In      montelukast (Singulair) 10 mg tablet Take 1 tablet (10 mg total) by mouth daily at bedtime, Starting Wed 8/17/2022, Normal      predniSONE 10 mg tablet Take 3 BID for 3 days then 2 BID for 3 days then 1 BID for 3 days then 1 QD for 3 days then 1/2 QD for 3 days then stop, Normal           No discharge procedures on file  Prior to Admission Medications   Prescriptions Last Dose Informant Patient Reported? Taking? albuterol (Ventolin HFA) 90 mcg/act inhaler   No No   Sig: Inhale 2 puffs every 6 (six) hours as needed for wheezing   esomeprazole (NexIUM) 40 MG capsule   No No   Sig: Take 1 capsule (40 mg total) by mouth daily   fluticasone (Arnuity Ellipta) 100 MCG/ACT AEPB inhaler   No No   Sig: Inhale 1 puff daily Rinse mouth after use    montelukast (Singulair) 10 mg tablet   No No   Sig: Take 1 tablet (10 mg total) by mouth daily at bedtime   predniSONE 10 mg tablet   No No   Sig: Take 3 BID for 3 days then 2 BID for 3 days then 1 BID for 3 days then 1 QD for 3 days then 1/2 QD for 3 days then stop      Facility-Administered Medications: None       Portions of the record may have been created with voice recognition software  Occasional wrong word or \"sound a like\" substitutions may have occurred due to the inherent limitations of voice recognition software  Read the chart carefully and recognize, using context, where substitutions have occurred      Electronically signed by:  MD Karen Collins MD  03/26/23 " [facility]

## 2023-03-27 ENCOUNTER — PATIENT MESSAGE (OUTPATIENT)
Dept: FAMILY MEDICINE CLINIC | Facility: CLINIC | Age: 48
End: 2023-03-27

## 2023-03-27 DIAGNOSIS — H81.10 BENIGN PAROXYSMAL POSITIONAL VERTIGO, UNSPECIFIED LATERALITY: ICD-10-CM

## 2023-03-27 NOTE — PROGRESS NOTES
1  Benign paroxysmal positional vertigo, unspecified laterality  Assessment & Plan:  Patient likely does have benign paroxysmal vertigo  Recommend PT  Would go for vestibular rehab  Spoke with his wife today, and he requested this based on the message from earlier  Orders:  -     Ambulatory Referral to Physical Therapy;  Future

## 2023-03-27 NOTE — ASSESSMENT & PLAN NOTE
Patient likely does have benign paroxysmal vertigo  Recommend PT  Would go for vestibular rehab  Spoke with his wife today, and he requested this based on the message from earlier

## 2023-04-05 ENCOUNTER — EVALUATION (OUTPATIENT)
Dept: PHYSICAL THERAPY | Facility: CLINIC | Age: 48
End: 2023-04-05

## 2023-04-05 DIAGNOSIS — H81.10 BENIGN PAROXYSMAL POSITIONAL VERTIGO, UNSPECIFIED LATERALITY: ICD-10-CM

## 2023-04-05 NOTE — PROGRESS NOTES
PT Evaluation     Today's date: 2023  Patient name: Katy Webber  : 1975  MRN: [id number]  Referring provider: Trina Galeas MD  Dx:   Encounter Diagnosis     ICD-10-CM    1  Benign paroxysmal positional vertigo, unspecified laterality  H81 10 Ambulatory Referral to Physical Therapy                     Assessment  Assessment details: Pt is a 50y o  year old male that presents to outpatient physical therapy with acute BPPV  Pt demonstrates signs and symptoms that point to possible L posterior canalithiasis involvement  Pt demonstrates clock-wise torsional downbeating nystagmus with L posterior canal positional testing  Occulomotor and VOR testing were negative today  Pt demonstrates and reports decreased activity tolerance and decreased functional activity due to dizziness  Left catie-Hallpike was performed today which resulted in an emesis following full maneuver  Upon retesting, Pt denied symptoms and was tested negative  Self-Liberatory maneuver was reviewed and given to patient  Pt would benefit from skilled physical therapy to address noted impairments, meet patient's goals, and to return to PLOF  Thank you for this referral      Impairments: abnormal gait, activity intolerance, impaired balance and lacks appropriate home exercise program    Symptom irritability: lowUnderstanding of Dx/Px/POC: good  Goals  STGs:   1  Pt will be able to demonstrate no dizziness symptoms for 5 consecutive days within 3 weeks  2  Pt will be able to achieve increased cervical ROM by at least 25% without increase of symptoms/dizziness within 3 weeks  3  Pt will be able to report dizziness symptoms less than 4/10 at worse for at least 3 days within 4 weeks  LTGs:   1  Pt will be independent with all IADLs without dizziness or discomfort upon discharge  2  Pt will be independent with HEP upon discharge   3   Pt will be able report no dizziness with all bed mobility and transfers upon discharge       Plan  Plan details: Plan to re-assess vestibular system in one week  Recommended to contact office if symptoms worsen prior to next visit  Patient would benefit from: PT eval and skilled physical therapy  Planned therapy interventions: ADL retraining, joint mobilization, manual therapy, balance, balance/weight bearing training, canalith repositioning, home exercise program, therapeutic training, therapeutic exercise, therapeutic activities, neuromuscular re-education, patient education, stretching, abdominal trunk stabilization, flexibility, gait training, community reintegration and strengthening  Frequency: 1x week  Duration in weeks: 4  Treatment plan discussed with: patient        Subjective Evaluation    History of Present Illness  Mechanism of injury: Pt states that he was working on a motor vehicle and a piece of dirt fell in his ear  He then had the onset of sensation like the room was spinning when he turn to the left  He then moved his ear around to help clear out the dirt and felt improved but then later on had a recurrence of the symptoms  Indicates that he went to the ED and had a CT scan and was given medication for dizziness  Reports that his dizziness has been better overall, but still feels a little bit when turning over in bed  Objective     Concurrent Complaints  Positive for headaches and nausea/motion sickness  Negative for tinnitus, visual change, hearing loss, memory loss, aural fullness, poor concentration and peripheral neuropathy    Active Range of Motion   Cervical/Thoracic Spine     Normal active range of motion  Neuro Exam:     Dizziness  Positive for disequilibrium and vertigo  Negative for oscillopsia, motion sickness, light-headedness, rocking or swaying, diplopia and floating or swimming  Exacerbating factors  Positive for rolling in bed and turning head     Negative for bending over, looking up, walking, supine to/from sitting, optokinetic movement and walking in busy environment  Symptoms   Duration: 10-20 seconds    Headaches   Patient reports headaches: Yes     Frequency: Chronic in nature    Cervical exam   Modified VBI   Left: asymptomatic  Right: asymptomatic    Oculomotor exam   Oculomotor ROM: WNL  Resting nystagmus: not present   Gaze holding nystagmus: not present left  and not present right  Smooth pursuits: within normal limits  Vertical saccades: normal  Horizontal saccades: normal  Convergence: normal  Head thrust: left normal    Positional testing   Marti-Hallpike   Left posterior canal: symptomatic and torsional  Right posterior canal: symptomatic  Roll test   Left horizontal canal: WNL  Right horizontal canal: WNL  Positional testing comment: CTSIB - mild LOB with tandem stance with eyes open              Precautions: asthma    Daily Treatment Diary    Date 4/5            FOTO IE -             Re-Eval IE               Manuals    Blossom MCARTHUR 2x (L)                                                   Neuro Re-Ed                                                                                                Ther Ex                                                                                                                                                   Ther Activity                              Gait Training                              Modalities

## 2023-05-16 DIAGNOSIS — K21.9 GASTROESOPHAGEAL REFLUX DISEASE WITHOUT ESOPHAGITIS: ICD-10-CM

## 2023-05-16 DIAGNOSIS — J45.40 MODERATE PERSISTENT ASTHMA WITHOUT COMPLICATION: ICD-10-CM

## 2023-05-16 RX ORDER — MONTELUKAST SODIUM 10 MG/1
10 TABLET ORAL
Qty: 90 TABLET | Refills: 3 | Status: SHIPPED | OUTPATIENT
Start: 2023-05-16

## 2023-05-16 RX ORDER — ESOMEPRAZOLE MAGNESIUM 40 MG/1
40 CAPSULE, DELAYED RELEASE ORAL DAILY
Qty: 90 CAPSULE | Refills: 3 | Status: SHIPPED | OUTPATIENT
Start: 2023-05-16

## 2023-07-26 ENCOUNTER — OFFICE VISIT (OUTPATIENT)
Dept: FAMILY MEDICINE CLINIC | Facility: CLINIC | Age: 48
End: 2023-07-26
Payer: COMMERCIAL

## 2023-07-26 VITALS
HEART RATE: 88 BPM | SYSTOLIC BLOOD PRESSURE: 110 MMHG | BODY MASS INDEX: 35.56 KG/M2 | OXYGEN SATURATION: 97 % | HEIGHT: 71 IN | DIASTOLIC BLOOD PRESSURE: 80 MMHG | WEIGHT: 254 LBS | RESPIRATION RATE: 18 BRPM

## 2023-07-26 DIAGNOSIS — J45.40 MODERATE PERSISTENT ASTHMA WITHOUT COMPLICATION: ICD-10-CM

## 2023-07-26 DIAGNOSIS — E66.01 CLASS 2 SEVERE OBESITY DUE TO EXCESS CALORIES WITH SERIOUS COMORBIDITY AND BODY MASS INDEX (BMI) OF 35.0 TO 35.9 IN ADULT (HCC): Primary | ICD-10-CM

## 2023-07-26 DIAGNOSIS — K21.9 GASTROESOPHAGEAL REFLUX DISEASE WITHOUT ESOPHAGITIS: ICD-10-CM

## 2023-07-26 PROBLEM — R51.9 WORSENING HEADACHES: Status: RESOLVED | Noted: 2020-01-10 | Resolved: 2023-07-26

## 2023-07-26 PROBLEM — H81.10 BENIGN PAROXYSMAL POSITIONAL VERTIGO: Status: RESOLVED | Noted: 2023-03-27 | Resolved: 2023-07-26

## 2023-07-26 PROCEDURE — 99214 OFFICE O/P EST MOD 30 MIN: CPT | Performed by: FAMILY MEDICINE

## 2023-07-26 RX ORDER — MONTELUKAST SODIUM 10 MG/1
10 TABLET ORAL
Qty: 90 TABLET | Refills: 3 | Status: SHIPPED | OUTPATIENT
Start: 2023-07-26

## 2023-07-26 RX ORDER — ESOMEPRAZOLE MAGNESIUM 40 MG/1
40 CAPSULE, DELAYED RELEASE ORAL DAILY
Qty: 90 CAPSULE | Refills: 3 | Status: SHIPPED | OUTPATIENT
Start: 2023-07-26

## 2023-07-26 NOTE — PATIENT INSTRUCTIONS
1. Class 2 severe obesity due to excess calories with serious comorbidity and body mass index (BMI) of 35.0 to 35.9 in adult [facility])  Assessment & Plan:  BMI is elevated. Limit carbs. Increase exercise. 2. Moderate persistent asthma without complication  Assessment & Plan:  Patient has been doing quite well. Lungs today are clear. Continue with Singulair. Continue as needed albuterol. Orders:  -     montelukast (Singulair) 10 mg tablet; Take 1 tablet (10 mg total) by mouth daily at bedtime    3. Gastroesophageal reflux disease without esophagitis  Assessment & Plan:  Doing quite well with his reflux using Nexium. He does note that if he misses 1 or 2 doses of Nexium he is fine, but if he misses more than that, he notices increased problems. Continue the Nexium based on that. Orders:  -     esomeprazole (NexIUM) 40 MG capsule; Take 1 capsule (40 mg total) by mouth daily        COVID 19 Instructions    Ra Teresa was advised to limit contact with others to essential tasks such as getting food, medications, and medical care. Proper handwashing reviewed, and Hand sanitzer when washing is not available. If the patient develops symptoms of COVID 19, the patient should call the office as soon as possible. For 6733-1636 Flu season, it is strongly recommended that Flu Vaccinations be obtained. Virtual Visits:  Joselo: This works on smart phones (any phone with Internet browsing capability). You should get a text message when the provider is ready to see you. Click on the link in the text message, and the call should start. You will need to type in your name, and allow camera and microphone access. This is HIPPA compliant, and secure. If you have not already done so, get immunized to COVID 19. We are committed to getting you vaccinated as soon as possible and will be closely following CDC and [facility] guidelines as they are released and revised.   Please refer to our COVID-19 vaccine webpage for the most up to date information on the vaccine and our distribution efforts. This site will also have the most up to date recommendations for COVID booster vaccine. [email or web address]    Call [phone] ([phone]), option 7    OUR NEW LOCATION:    [address], [address] [place] [address], [address]  Fax: [phone]    Lab services and OB/GYN are at this location as well.

## 2023-07-26 NOTE — PROGRESS NOTES
Name: Jomar Gordon      : 1975      MRN: [id number]  Encounter Provider: Khushbu Ambriz MD  Encounter Date: 2023   Encounter department: [facility] PRIMARY CARE    Assessment & Plan     1. Class 2 severe obesity due to excess calories with serious comorbidity and body mass index (BMI) of 35.0 to 35.9 in adult [facility])  Assessment & Plan:  BMI is elevated. Limit carbs. Increase exercise. 2. Moderate persistent asthma without complication  Assessment & Plan:  Patient has been doing quite well. Lungs today are clear. Continue with Singulair. Continue as needed albuterol. Orders:  -     montelukast (Singulair) 10 mg tablet; Take 1 tablet (10 mg total) by mouth daily at bedtime    3. Gastroesophageal reflux disease without esophagitis  Assessment & Plan:  Doing quite well with his reflux using Nexium. He does note that if he misses 1 or 2 doses of Nexium he is fine, but if he misses more than that, he notices increased problems. Continue the Nexium based on that. Orders:  -     esomeprazole (NexIUM) 40 MG capsule; Take 1 capsule (40 mg total) by mouth daily           Subjective      Chief Complaint   Patient presents with   • Follow-up     6 months follow up       Here to follow-up on multiple issues. Asthma: Patient reports he feels quite good overall. No significant problems. Currently using Singulair, as needed albuterol. Not on Arnuity, as he only use that once, and has not needed to continue it. GERD: Currently on Nexium. Seems to be helping. Vertigo patient did have an episode of that previously. He has been off the Valium from that for a while now. Review of Systems   Constitutional: Negative. HENT: Negative. Eyes: Negative. Respiratory: Negative. Cardiovascular: Negative. Gastrointestinal: Negative. Endocrine: Negative. Genitourinary: Negative. Musculoskeletal: Negative. Skin: Negative. Allergic/Immunologic: Negative. Neurological: Negative. Hematological: Negative. Psychiatric/Behavioral: Negative. Current Outpatient Medications on File Prior to Visit   Medication Sig   • [DISCONTINUED] esomeprazole (NexIUM) 40 MG capsule Take 1 capsule (40 mg total) by mouth daily   • [DISCONTINUED] montelukast (Singulair) 10 mg tablet Take 1 tablet (10 mg total) by mouth daily at bedtime   • albuterol (Ventolin HFA) 90 mcg/act inhaler Inhale 2 puffs every 6 (six) hours as needed for wheezing (Patient not taking: Reported on 7/26/2023)   • [DISCONTINUED] diazepam (VALIUM) 5 mg tablet Take 1 tablet (5 mg total) by mouth every 12 (twelve) hours as needed (Vertigo) for up to 7 doses (Patient not taking: Reported on 7/26/2023)   • [DISCONTINUED] fluticasone (Arnuity Ellipta) 100 MCG/ACT AEPB inhaler Inhale 1 puff daily Rinse mouth after use. • [DISCONTINUED] meclizine (ANTIVERT) 25 mg tablet Take 1 tablet (25 mg total) by mouth 3 (three) times a day as needed for dizziness (Patient not taking: Reported on 7/26/2023)   • [DISCONTINUED] predniSONE 10 mg tablet Take 3 BID for 3 days then 2 BID for 3 days then 1 BID for 3 days then 1 QD for 3 days then 1/2 QD for 3 days then stop (Patient not taking: Reported on 7/26/2023)       Objective     /80 (BP Location: Left arm, Patient Position: Sitting, Cuff Size: Large)   Pulse 88   Resp 18   Ht 5' 11" (1.803 m)   Wt 115 kg (254 lb)   SpO2 97%   BMI 35.43 kg/m²     Physical Exam  Vitals and nursing note reviewed. Constitutional:       Appearance: He is well-developed. HENT:      Head: Normocephalic and atraumatic. Cardiovascular:      Rate and Rhythm: Normal rate and regular rhythm. Pulses:           Carotid pulses are 2+ on the right side and 2+ on the left side. Heart sounds: Normal heart sounds. No murmur heard. No friction rub. No gallop. Pulmonary:      Effort: Pulmonary effort is normal. No respiratory distress. Breath sounds: Normal breath sounds. No wheezing or rales. Musculoskeletal:      Cervical back: Normal range of motion and neck supple.        Leni Zuleta MD

## 2023-07-26 NOTE — ASSESSMENT & PLAN NOTE
Patient has been doing quite well. Lungs today are clear. Continue with Singulair. Continue as needed albuterol.

## 2023-08-10 ENCOUNTER — APPOINTMENT (OUTPATIENT)
Dept: LAB | Facility: MEDICAL CENTER | Age: 48
End: 2023-08-10

## 2023-08-10 DIAGNOSIS — Z00.8 HEALTH EXAMINATION IN POPULATION SURVEY: ICD-10-CM

## 2023-08-10 LAB
CHOLEST SERPL-MCNC: 222 MG/DL
HDLC SERPL-MCNC: 38 MG/DL
LDLC SERPL CALC-MCNC: 158 MG/DL (ref 0–100)
NONHDLC SERPL-MCNC: 184 MG/DL
TRIGL SERPL-MCNC: 128 MG/DL

## 2023-08-10 PROCEDURE — 83036 HEMOGLOBIN GLYCOSYLATED A1C: CPT

## 2023-08-10 PROCEDURE — 80061 LIPID PANEL: CPT

## 2023-08-10 PROCEDURE — 36415 COLL VENOUS BLD VENIPUNCTURE: CPT

## 2023-08-13 LAB
EST. AVERAGE GLUCOSE BLD GHB EST-MCNC: 134 MG/DL
HBA1C MFR BLD: 6.3 %

## 2024-01-31 ENCOUNTER — OFFICE VISIT (OUTPATIENT)
Dept: FAMILY MEDICINE CLINIC | Facility: CLINIC | Age: 49
End: 2024-01-31
Payer: COMMERCIAL

## 2024-01-31 VITALS
WEIGHT: 259 LBS | RESPIRATION RATE: 18 BRPM | DIASTOLIC BLOOD PRESSURE: 80 MMHG | HEART RATE: 69 BPM | SYSTOLIC BLOOD PRESSURE: 128 MMHG | BODY MASS INDEX: 36.26 KG/M2 | HEIGHT: 71 IN | OXYGEN SATURATION: 96 %

## 2024-01-31 DIAGNOSIS — J45.40 MODERATE PERSISTENT ASTHMA WITHOUT COMPLICATION: ICD-10-CM

## 2024-01-31 DIAGNOSIS — E78.2 MIXED HYPERLIPIDEMIA: Primary | ICD-10-CM

## 2024-01-31 DIAGNOSIS — K21.9 GASTROESOPHAGEAL REFLUX DISEASE WITHOUT ESOPHAGITIS: ICD-10-CM

## 2024-01-31 DIAGNOSIS — E66.01 CLASS 2 SEVERE OBESITY DUE TO EXCESS CALORIES WITH SERIOUS COMORBIDITY AND BODY MASS INDEX (BMI) OF 36.0 TO 36.9 IN ADULT: ICD-10-CM

## 2024-01-31 PROBLEM — B35.3 TINEA PEDIS OF RIGHT FOOT: Status: RESOLVED | Noted: 2019-11-20 | Resolved: 2024-01-31

## 2024-01-31 PROBLEM — E78.5 HYPERLIPIDEMIA: Status: ACTIVE | Noted: 2024-01-31

## 2024-01-31 PROCEDURE — 99214 OFFICE O/P EST MOD 30 MIN: CPT | Performed by: FAMILY MEDICINE

## 2024-01-31 NOTE — PROGRESS NOTES
Name: Akash Moore      : 1975      MRN: [id number]  Encounter Provider: Magdy Weller MD  Encounter Date: 2024   Encounter department: [facility] PRIMARY CARE    Assessment & Plan     1. Mixed hyperlipidemia  Assessment & Plan:  Was elevated in past. No changes.  Due again in near future.  I recommend repeat labs.    Orders:  -     Comprehensive metabolic panel; Future; Expected date: 2024  -     Lipid panel; Future; Expected date: 2024    2. Class 2 severe obesity due to excess calories with serious comorbidity and body mass index (BMI) of 36.0 to 36.9 in adult   Assessment & Plan:  Reviewed about exercise and diet issues.    Consider follow with Bariatrics.      Orders:  -     Comprehensive metabolic panel; Future; Expected date: 2024  -     TSH, 3rd generation; Future; Expected date: 2024  -     CBC and differential; Future; Expected date: 2024    3. Moderate persistent asthma without complication  Assessment & Plan:  No need for albuterol lately.  Continue singulair.        4. Gastroesophageal reflux disease without esophagitis  Assessment & Plan:  Stable with Nexium.             Subjective      Chief Complaint   Patient presents with   • Follow-up     6 months follow up       Patient is here today to follow-up on multiple issues.    Patient is somewhat concerned about kidney function, as he does have family history of renal dysfunction.  Both of his parents have some aspect of this, there are some other medical problems that they have.  He is not having any particular issues at the moment, especially with urination.    Asthma: Rarely if ever uses albuterol.  He does use Zyrtec and Singulair.  That seems to be helping quite a bit.    GERD: On Nexium.  No particular issues with that either.    Hyperlipidemia: Noted before.  This is on blood work that was done for health maintenance program with work.  Reviewed about his cholesterol from before as  "well.    Hyperglycemia: His A1c was minimally elevated, but nothing else in particular with that.    Obesity: Weight has gone up slightly.  With his employment, he does have a long commute, starts quite early in the morning, and finds it difficult to increase his physical activity to what it used to be.  Reports that his diet is not actually that bad, and he also has smaller meals.      Review of Systems   Constitutional: Negative.    HENT: Negative.     Eyes: Negative.    Respiratory: Negative.     Cardiovascular: Negative.    Gastrointestinal: Negative.    Endocrine: Negative.    Genitourinary: Negative.    Musculoskeletal: Negative.    Skin: Negative.    Allergic/Immunologic: Negative.    Neurological: Negative.    Hematological: Negative.    Psychiatric/Behavioral: Negative.         Current Outpatient Medications on File Prior to Visit   Medication Sig   • esomeprazole (NexIUM) 40 MG capsule Take 1 capsule (40 mg total) by mouth daily   • montelukast (Singulair) 10 mg tablet Take 1 tablet (10 mg total) by mouth daily at bedtime   • albuterol (Ventolin HFA) 90 mcg/act inhaler Inhale 2 puffs every 6 (six) hours as needed for wheezing (Patient not taking: Reported on 7/26/2023)       Objective     /80 (BP Location: Left arm, Patient Position: Sitting, Cuff Size: Large)   Pulse 69   Resp 18   Ht 5' 11\" (1.803 m)   Wt 117 kg (259 lb)   SpO2 96%   BMI 36.12 kg/m²     Physical Exam  Vitals and nursing note reviewed.   Constitutional:       Appearance: Normal appearance. He is well-developed.   HENT:      Head: Normocephalic and atraumatic.   Cardiovascular:      Rate and Rhythm: Normal rate and regular rhythm.      Pulses:           Carotid pulses are 2+ on the right side and 2+ on the left side.     Heart sounds: Normal heart sounds. No murmur heard.     No friction rub. No gallop.   Pulmonary:      Effort: Pulmonary effort is normal. No respiratory distress.      Breath sounds: Normal breath sounds. No " wheezing or rales.   Musculoskeletal:      Cervical back: Normal range of motion and neck supple.   Neurological:      Mental Status: He is alert.       Magdy Weller MD

## 2024-02-08 ENCOUNTER — PATIENT OUTREACH (OUTPATIENT)
Dept: BARIATRICS | Facility: CLINIC | Age: 49
End: 2024-02-08

## 2024-02-08 NOTE — PROGRESS NOTES
Weight History     Highest Weight: 247 (current)  Lowest Weight: 205# (last 10 years)    Past Attempts at Weight Loss: Commerical Programs (Weight Watchers, Jodi Kartik, NutriSystem etc) and calorie counting, nutrisystem    Food Recall  Breakfast: oatmeal or hard boiled eggs or bagel  Snack: none  Lunch: salad or quinoa with protein or burger  -if at work more structured  Snack: none  Dinner: protein veggies starch  Snack: none    Beverages: water, coffee (with creamer most often)  Volume of Beverage Intake: 36oz water, 3 cups coffee daily    Frequency of Dining out: 2x/week    Would the patient benefit from visit with  specialist?: Boredom Eating, would be interested in meeting with     Physical Activity Intake  Activity: Biking and treadmill , walking  -treadmill walking 40 minutes (on incline)  Frequency of Exercise: several times a week  Physical Limitations to Exercise: none    Review of Programs  Overview of medical weight management programs provided?: Yes   Is patient interested in discussing medication?: Yes   Is patient interested in discussing bariatric surgery?: No  Does patient know which pathway they are interested in?: No

## 2024-02-15 ENCOUNTER — OFFICE VISIT (OUTPATIENT)
Dept: BARIATRICS | Facility: CLINIC | Age: 49
End: 2024-02-15
Payer: COMMERCIAL

## 2024-02-15 VITALS
RESPIRATION RATE: 14 BRPM | HEIGHT: 70 IN | BODY MASS INDEX: 36.02 KG/M2 | DIASTOLIC BLOOD PRESSURE: 80 MMHG | SYSTOLIC BLOOD PRESSURE: 140 MMHG | HEART RATE: 78 BPM | WEIGHT: 251.6 LBS | TEMPERATURE: 97.8 F

## 2024-02-15 DIAGNOSIS — R73.03 PREDIABETES: ICD-10-CM

## 2024-02-15 DIAGNOSIS — K21.9 GASTROESOPHAGEAL REFLUX DISEASE WITHOUT ESOPHAGITIS: ICD-10-CM

## 2024-02-15 DIAGNOSIS — E66.9 OBESITY, CLASS II, BMI 35-39.9: Primary | ICD-10-CM

## 2024-02-15 PROBLEM — E66.812 OBESITY, CLASS II, BMI 35-39.9: Status: ACTIVE | Noted: 2019-03-28

## 2024-02-15 PROCEDURE — 99204 OFFICE O/P NEW MOD 45 MIN: CPT | Performed by: NURSE PRACTITIONER

## 2024-02-15 RX ORDER — CETIRIZINE HYDROCHLORIDE 10 MG/1
10 TABLET ORAL DAILY
COMMUNITY

## 2024-02-15 RX ORDER — MULTIVITAMIN
TABLET ORAL
COMMUNITY

## 2024-02-15 NOTE — PROGRESS NOTES
Assessment/Plan:    Obesity, Class II, BMI 35-39.9  - Discussed options of HealthyCORE-Intensive Lifestyle Intervention Program, Very Low Calorie Diet-VLCD, and Conservative Program and the role of weight loss medications.  - Explained the importance of making lifestyle changes with anti-obesity medications.  - Patient is interested in pursuing Conservative Program  - Initial weight loss goal of 5-10% weight loss for improved health  - Weight loss can improve patient's co-morbid conditions and/or prevent weight-related complications.  - Stop Bang 4/8  - Has been exercising, watching diet and portions, but still struggles to lose weight. Interested in weight loss medication to assist with that.   - FDA approved weight loss medications reviewed: Wegovy, Saxenda, Zepbound, Qsymia, Contrave, and Phentermine. Wegovy and Saxenda shortage discussed. Off label use of medications discussed.   - Patient denies personal history of pancreatitis. Patient also denies personal and family history of thyroid cancer and multiple endocrine neoplasia type 2 (MEN 2 tumor).   - He made an informed decision to start Wegovy.  - Start Wegovy 0.25 mg subcutaneously once a week for 4 weeks, then increase to 0.5 mg subcutaneously each week. After you take the first pen of the starting dose of 0.25 mg, please message me with an update regarding how you are feeling. As long as you are tolerating it well, I will submit the next dose of 0.5 mg to the pharmacy, as we will also likely need to do another prior authorization for that dose.   - Side effects of Wegovy discussed: nausea, vomiting, diarrhea, and constipation. If severe abdominal pain develops, stop Wegovy and go to the ER, as this could be pancreatitis. Monitor heart rate while on Wegovy and if resting heart rate greater than 100 beats per minute, patient will notify me.   - If you need to have surgery or another procedure, such as an upper endoscopy or colonoscopy, please contact my  office as often medications like Wegovy need to be held for a certain amount of time prior to a procedure.   - If not able to find Wegovy, will likely start Metformin.   - Labs reviewed: Lipid, A1C, BMP, and CBC 8/10/2023. Chol, LDL elevated, HDL low, and A1C in prediabetes range at 6.3, which will all likely improve with weight loss. Remainder of the blood work within acceptable range.       Goals:  Do not skip meals.  Food log (ie.) www.myfitnesspal.com,sparkpeople.com,loseit.com,calorieking.com,etc. baritastic (use skinnytaste.com, [employer] or smartphone carmen [employer] for recipes)  No sugary beverages. At least 64oz of water daily.  Increase physical activity by 10 minutes daily. Gradually increase physical activity to a goal of 5 days per week for 30 minutes of MODERATE intensity PLUS 2 days per week of FULL BODY resistance training (use smartphone apps [employer], Home Workout, etc.)  Start food logging, weighing, and measuring food.  2000 calories per day. Sample menu given.   Increase water intake to at least 64 oz daily.   Increase exercise to goal of 5 days per week for at least 30 minutes cardio and 2 days per week resistance training.   Start Wegovy.    Prediabetes  Will likely improve with weight loss.     Gastroesophageal reflux disease without esophagitis  - Taking nexium. May improve with weight loss and lifestyle modification. Continue management with prescribing provider.          Akash was seen today for consult.    Diagnoses and all orders for this visit:    Obesity, Class II, BMI 35-39.9  -     Semaglutide-Weight Management (WEGOVY) 0.25 MG/0.5ML; Inject 0.5 mL (0.25 mg total) under the skin once a week    Prediabetes  -     Semaglutide-Weight Management (WEGOVY) 0.25 MG/0.5ML; Inject 0.5 mL (0.25 mg total) under the skin once a week    Gastroesophageal reflux disease without esophagitis        Total time spent: 45 min, with >50% face-to-face time spent counseling patient on nonsurgical  interventions for the treatment of excess weight. Discussed in detail nonsurgical options including intensive lifestyle intervention program, very low-calorie diet program and conservative program.  Discussed the role of weight loss medications.  Counseled patient on diet behavior and exercise modification for weight loss.        Follow up in approximately  2 month nurse visit and 4 months  with Non-Surgical Physician/Advanced Practitioner.    Subjective:   Chief Complaint   Patient presents with    Consult     MWM- Consult, GW 205lb       Patient ID: Akash Moore  is a 48 y.o. male with excess weight/obesity here to pursue weight management.  Previous notes and records have been reviewed.    Past Medical History:   Diagnosis Date    Allergic     Asthma     Benign paroxysmal positional vertigo 3/27/2023    Hyperlipidemia     Hypertension     Obesity     Pre-diabetes      Past Surgical History:   Procedure Laterality Date    APPENDECTOMY         HPI:  Wt Readings from Last 20 Encounters:   02/15/24 114 kg (251 lb 9.6 oz)   01/31/24 117 kg (259 lb)   07/26/23 115 kg (254 lb)   03/26/23 11.5 kg (25 lb 6.4 oz)   01/31/23 116 kg (255 lb)   01/28/23 115 kg (254 lb)   01/05/23 116 kg (255 lb 12.8 oz)   08/17/22 111 kg (245 lb)   05/25/21 102 kg (225 lb)   02/11/21 107 kg (235 lb 14.3 oz)   11/24/20 109 kg (241 lb)   01/14/20 106 kg (233 lb)   01/09/20 105 kg (232 lb)   11/20/19 104 kg (229 lb)   07/02/19 104 kg (229 lb)   03/28/19 107 kg (235 lb 3.2 oz)   02/05/19 107 kg (236 lb)   10/09/18 107 kg (235 lb)     Obesity/Excess Weight:  Severity: Moderate  Onset:  worse past 2.5 years    Modifiers: Diet and Exercise and Commercial Weight Loss Programs-ie. Weight Watchers, LearnBIG, Nutrisystem, etc.  Contributing factors:  unsure what is contributing  Associated symptoms: decreased self esteem    Has been exercising for years, watches diet and portions, but still not successful with losing weight.     Hydration: at least  32 oz water, 2 cups coffee with creamer   Alcohol: rare beer  Smoking: denies  Exercise: treadmill 2 times per week 45-60 minutes with incline  Occupation: [employer]  Sleep: 7 hours  STOP ban/8    Highest weight: current  Current weight: 251.6 lbs BMI 36.10  Goal weight: 200-205 lbs    Colonoscopy: UTD, reports he gets cologuard    I reviewed the following information: weight history, food recall, physical activity intake, and review of programs obtained by Nissa Brito RD on 2024.     Weight History      Highest Weight: 247 (current)  Lowest Weight: 205# (last 10 years)     Past Attempts at Weight Loss: Commerical Programs (Weight Watchers, [employer], NutriSystem etc) and calorie counting, nutrisystem     Food Recall  Breakfast: oatmeal or hard boiled eggs or bagel  Snack: none  Lunch: salad or quinoa with protein or burger  -if at work more structured  Snack: none  Dinner: protein veggies starch  Snack: none     Beverages: water, coffee (with creamer most often)  Volume of Beverage Intake: 36oz water, 3 cups coffee daily     Frequency of Dining out: 2x/week     Would the patient benefit from visit with  specialist?: Boredom Eating, would be interested in meeting with      Physical Activity Intake  Activity: Biking and treadmill , walking  -treadmill walking 40 minutes (on incline)  Frequency of Exercise: several times a week  Physical Limitations to Exercise: none     Review of Programs  Overview of medical weight management programs provided?: Yes   Is patient interested in discussing medication?: Yes   Is patient interested in discussing bariatric surgery?: No  Does patient know which pathway they are interested in?: No        The following portions of the patient's history were reviewed and updated as appropriate: allergies, current medications, past family history, past medical history, past social history, past surgical history, and problem list.    Family History   Problem Relation Age  "of Onset    Diabetes Mother     Asthma Mother     Diabetes Father     Hypertension Father     Cancer Neg Hx     Thyroid disease Neg Hx         Review of Systems   HENT:  Negative for sore throat.    Respiratory:  Negative for cough and shortness of breath.    Cardiovascular:  Negative for chest pain and palpitations.   Gastrointestinal:  Negative for abdominal pain, constipation, diarrhea, nausea and vomiting.        + GERD controlled with medication   Endocrine: Negative for cold intolerance and heat intolerance.   Genitourinary:  Negative for dysuria.   Musculoskeletal:  Negative for arthralgias and back pain.   Skin:  Negative for rash.   Neurological:  Negative for headaches.   Psychiatric/Behavioral:  Negative for suicidal ideas (or HI).         Denies depression and anxiety       Objective:  /80   Pulse 78   Temp 97.8 °F (36.6 °C)   Resp 14   Ht 5' 10\" (1.778 m)   Wt 114 kg (251 lb 9.6 oz)   BMI 36.10 kg/m²     Physical Exam  Vitals and nursing note reviewed.          Constitutional   General appearance: Abnormal.  well developed and obese.   Eyes No conjunctival injection.   Ears, Nose, Mouth, and Throat Oral mucosa moist.   Pulmonary   Respiratory effort: No increased work of breathing or signs of respiratory distress.     Cardiovascular     Examination of extremities for edema and/or varicosities: Normal.  no edema.   Abdomen   Abdomen: Abnormal.  The abdomen was obese.    Musculoskeletal   Normal range of motion  Neurological   Gait and station: Normal.    Psychiatric   Orientation to person, place and time: Normal.    Affect: appropriate     "

## 2024-02-15 NOTE — ASSESSMENT & PLAN NOTE
- Discussed options of HealthyCORE-Intensive Lifestyle Intervention Program, Very Low Calorie Diet-VLCD, and Conservative Program and the role of weight loss medications.  - Explained the importance of making lifestyle changes with anti-obesity medications.  - Patient is interested in pursuing Conservative Program  - Initial weight loss goal of 5-10% weight loss for improved health  - Weight loss can improve patient's co-morbid conditions and/or prevent weight-related complications.  - Stop Bang 4/8  - Has been exercising, watching diet and portions, but still struggles to lose weight. Interested in weight loss medication to assist with that.   - FDA approved weight loss medications reviewed: Wegovy, Saxenda, Zepbound, Qsymia, Contrave, and Phentermine. Wegovy and Saxenda shortage discussed. Off label use of medications discussed.   - Patient denies personal history of pancreatitis. Patient also denies personal and family history of thyroid cancer and multiple endocrine neoplasia type 2 (MEN 2 tumor).   - He made an informed decision to start Wegovy.  - Start Wegovy 0.25 mg subcutaneously once a week for 4 weeks, then increase to 0.5 mg subcutaneously each week. After you take the first pen of the starting dose of 0.25 mg, please message me with an update regarding how you are feeling. As long as you are tolerating it well, I will submit the next dose of 0.5 mg to the pharmacy, as we will also likely need to do another prior authorization for that dose.   - Side effects of Wegovy discussed: nausea, vomiting, diarrhea, and constipation. If severe abdominal pain develops, stop Wegovy and go to the ER, as this could be pancreatitis. Monitor heart rate while on Wegovy and if resting heart rate greater than 100 beats per minute, patient will notify me.   - If you need to have surgery or another procedure, such as an upper endoscopy or colonoscopy, please contact my office as often medications like Wegovy need to be held  for a certain amount of time prior to a procedure.   - If not able to find Wegovy, will likely start Metformin.   - Labs reviewed: Lipid, A1C, BMP, and CBC 8/10/2023. Chol, LDL elevated, HDL low, and A1C in prediabetes range at 6.3, which will all likely improve with weight loss. Remainder of the blood work within acceptable range.       Goals:  Do not skip meals.  Food log (ie.) www.myfitnesspal.com,sparkpeople.com,loseit.com,calorieking.com,etc. baritastic (use skinnytaste.com, [employer] or smartphone carmen [employer] for recipes)  No sugary beverages. At least 64oz of water daily.  Increase physical activity by 10 minutes daily. Gradually increase physical activity to a goal of 5 days per week for 30 minutes of MODERATE intensity PLUS 2 days per week of FULL BODY resistance training (use smartphone apps [employer], Home Workout, etc.)  Start food logging, weighing, and measuring food.  2000 calories per day. Sample menu given.   Increase water intake to at least 64 oz daily.   Increase exercise to goal of 5 days per week for at least 30 minutes cardio and 2 days per week resistance training.   Start Wegovy.

## 2024-02-16 ENCOUNTER — TELEPHONE (OUTPATIENT)
Age: 49
End: 2024-02-16

## 2024-02-16 NOTE — TELEPHONE ENCOUNTER
[facility],  [address]  Phone: [phone]   Fax:     [phone]                                                     Physical Therapy Daily Treatment Note  Date:  3/27/2023    Patient Name:  Gabriel Redamn    :  1973  MRN: [id number]  Restrictions/Precautions:    Medical/Treatment Diagnosis Information:  Diagnosis: M54.2 (ICD-10-CM) - Neck pain  Treatment Diagnosis: M54.2 (ICD-10-CM) - Neck pain  Insurance/Certification information:  PT Insurance Information: [facility]  Physician Information:  Dr. Sammie Squires  Has the plan of care been signed (Y/N):        []  Yes  [x]  No     Date of Patient follow up with Physician:       Is this a Progress Report:     []  Yes  [x]  No        If Yes:  Date Range for reporting period:  Beginning 3/21  Ending    Progress report will be due (10 Rx or 30 days whichever is less):        Recertification will be due (POC Duration  / 90 days whichever is less):          Visit # Insurance Allowable Auth Required   3  3/27 50 []  Yes [x]  No        Functional Scale: NDI 24%    Date assessed:  3/21     Latex Allergy:  [x]NO      []YES  Preferred Language for Healthcare:   [x]English       []other:      Pain level:   1-2/10    SUBJECTIVE:  A little more symptoms in the upper arm - last day of Dosepak yesterday 3/27    OBJECTIVE: See eval  Observation:   Test measurements:      RESTRICTIONS/PRECAUTIONS:     Exercises/Interventions:   Exercise/Equipment Resistance/Repetitions Other comments   Stretching/PROM     CROM     Chin tuck     UT side bend stretch R only 5x30\"  Added 3/27   Levater scap stretch     Scalene stretch     Pectoral stretch     3 finger rotation 3x10         Isometrics     Retraction     shrugs     Cervical Flexion      Cervical Extension     Cervical sidebending               PRE's     External Rotation 3x10 2# Added 3/24   Internal Rotation     Serratus PA for Wegovy    Submitted via    []CMM-KEY    [x]LambertoQuench-Case ID # [id number]   []Faxed to plan   []Other website    []Phone call Case ID #       Office notes sent, clinical questions answered. Awaiting determination    Turnaround time for your insurance to make a decision on your Prior Authorization can take 7-21 business days.

## 2024-02-16 NOTE — TELEPHONE ENCOUNTER
PA for Wegovy Approved   Date(s) approved February 14, 2024 to February 14, 2025   Case #     Patient advised by [x] [employer] Message                      [] Phone call       Pharmacy advised by [x]Fax                                     []Phone call    Approval letter scanned into Media No Haven't received

## 2024-02-19 DIAGNOSIS — R73.03 PREDIABETES: ICD-10-CM

## 2024-02-19 DIAGNOSIS — E66.9 OBESITY, CLASS II, BMI 35-39.9: ICD-10-CM

## 2024-02-19 NOTE — TELEPHONE ENCOUNTER
Patients spouse called and stated [facility] does not have medication and would like sent to [facility]    Reason for call:   [x] Refill   [] Prior Auth  [] Other:     Office:   [] PCP/Provider -   [x] Specialty/Provider - weight management/    Medication: Semaglutide-Weight Management (WEGOVY) 0.25 MG/0.5ML     Dose/Frequency: 0.25 mg, Subcutaneous, Weekly     Quantity: 2ml    Pharmacy: [facility]    Does the patient have enough for 3 days?   [] Yes   [x] No - Send as HP to POD

## 2024-03-04 ENCOUNTER — PATIENT MESSAGE (OUTPATIENT)
Dept: BARIATRICS | Facility: CLINIC | Age: 49
End: 2024-03-04

## 2024-03-04 ENCOUNTER — TELEPHONE (OUTPATIENT)
Age: 49
End: 2024-03-04

## 2024-03-04 DIAGNOSIS — E66.9 OBESITY, CLASS II, BMI 35-39.9: Primary | ICD-10-CM

## 2024-03-04 NOTE — TELEPHONE ENCOUNTER
PA for Wegovy    Submitted via    []CMM-KEY   [x]Link-Case ID # : [id number]  []Faxed to plan   []Other website   []Phone call Case ID #     Office notes sent, clinical questions answered. Awaiting determination    Turnaround time for your insurance to make a decision on your Prior Authorization can take 7-21 business days.

## 2024-03-08 ENCOUNTER — TELEPHONE (OUTPATIENT)
Age: 49
End: 2024-03-08

## 2024-03-08 NOTE — TELEPHONE ENCOUNTER
Spoke with wife. Advised that prior auth was started and that 0.5mg Wegovy was sent to preferred pharmacy.    Patient will check with pharmacy for availability.  No further questions

## 2024-04-15 ENCOUNTER — CLINICAL SUPPORT (OUTPATIENT)
Dept: BARIATRICS | Facility: CLINIC | Age: 49
End: 2024-04-15

## 2024-04-15 VITALS
DIASTOLIC BLOOD PRESSURE: 80 MMHG | WEIGHT: 240.6 LBS | SYSTOLIC BLOOD PRESSURE: 118 MMHG | TEMPERATURE: 97.8 F | HEIGHT: 70 IN | BODY MASS INDEX: 34.44 KG/M2 | HEART RATE: 74 BPM

## 2024-04-15 DIAGNOSIS — R63.5 ABNORMAL WEIGHT GAIN: Primary | ICD-10-CM

## 2024-04-15 PROCEDURE — RECHECK

## 2024-04-15 RX ORDER — SEMAGLUTIDE 0.5 MG/.5ML
0.5 INJECTION, SOLUTION SUBCUTANEOUS WEEKLY
COMMUNITY

## 2024-04-15 NOTE — PROGRESS NOTES
Patient last visit weight:  251 lb  Patient current visit weight: 240.6lb     If you are taking phentermine or other oral weight loss medications, are you experiencing any of the following symptoms:  Headache:   Blurred Vision:   Chest Pain:   Palpitations:  Insomnia:   SPECIFY ORAL MEDICATION AND DOSAGE:     If you are taking an injectable medication,  are you experiencing any of the following symptoms:  Bloating: no  Nausea: no  Vomiting: no  Constipation: no  Diarrhea: no  SPECIFY INJECTABLE MEDICATION AND CURRENT DOSAGE:  Pt taking  Wegovy , Pt requesting  1 mg  script to sent to  [facility]       Vitals:    Is BP less than 100/60? no  Is BP greater than 140/90? no  Is HR greater than 100? no  **If yes to any of the above, have patient relax and repeat in 5-10 minutes**    Repeat values:    Is BP less than 100/60?  Is BP greater than 140/90?  Is HR greater than 100?  **If values remain outside of ranges above, please consult provider for next steps**

## 2024-04-16 DIAGNOSIS — E66.9 OBESITY, CLASS II, BMI 35-39.9: Primary | ICD-10-CM

## 2024-05-06 ENCOUNTER — PATIENT MESSAGE (OUTPATIENT)
Dept: BARIATRICS | Facility: CLINIC | Age: 49
End: 2024-05-06

## 2024-05-06 DIAGNOSIS — R73.03 PREDIABETES: ICD-10-CM

## 2024-05-06 DIAGNOSIS — E66.9 OBESITY, CLASS II, BMI 35-39.9: Primary | ICD-10-CM

## 2024-05-07 ENCOUNTER — PATIENT MESSAGE (OUTPATIENT)
Dept: BARIATRICS | Facility: CLINIC | Age: 49
End: 2024-05-07

## 2024-05-07 DIAGNOSIS — E66.9 OBESITY, CLASS I, BMI 30-34.9: Primary | ICD-10-CM

## 2024-05-07 DIAGNOSIS — R73.03 PREDIABETES: ICD-10-CM

## 2024-05-07 DIAGNOSIS — E66.9 OBESITY, CLASS II, BMI 35-39.9: ICD-10-CM

## 2024-05-07 RX ORDER — SEMAGLUTIDE 1.7 MG/.75ML
1.7 INJECTION, SOLUTION SUBCUTANEOUS WEEKLY
Qty: 3 ML | Refills: 0 | Status: SHIPPED | OUTPATIENT
Start: 2024-05-07

## 2024-06-01 RX ORDER — SEMAGLUTIDE 2.4 MG/.75ML
2.4 INJECTION, SOLUTION SUBCUTANEOUS WEEKLY
Qty: 3 ML | Refills: 2 | Status: SHIPPED | OUTPATIENT
Start: 2024-06-01

## 2024-07-10 ENCOUNTER — OFFICE VISIT (OUTPATIENT)
Dept: BARIATRICS | Facility: CLINIC | Age: 49
End: 2024-07-10
Payer: COMMERCIAL

## 2024-07-10 VITALS
DIASTOLIC BLOOD PRESSURE: 80 MMHG | RESPIRATION RATE: 16 BRPM | TEMPERATURE: 97.1 F | BODY MASS INDEX: 31.98 KG/M2 | HEART RATE: 77 BPM | SYSTOLIC BLOOD PRESSURE: 130 MMHG | HEIGHT: 70 IN | WEIGHT: 223.4 LBS

## 2024-07-10 DIAGNOSIS — R73.03 PREDIABETES: ICD-10-CM

## 2024-07-10 DIAGNOSIS — K21.9 GASTROESOPHAGEAL REFLUX DISEASE WITHOUT ESOPHAGITIS: ICD-10-CM

## 2024-07-10 DIAGNOSIS — E66.9 OBESITY, CLASS II, BMI 35-39.9: Primary | ICD-10-CM

## 2024-07-10 DIAGNOSIS — E78.2 MIXED HYPERLIPIDEMIA: ICD-10-CM

## 2024-07-10 DIAGNOSIS — E66.9 OBESITY, CLASS I, BMI 30-34.9: ICD-10-CM

## 2024-07-10 DIAGNOSIS — J45.40 MODERATE PERSISTENT ASTHMA WITHOUT COMPLICATION: ICD-10-CM

## 2024-07-10 PROCEDURE — 99213 OFFICE O/P EST LOW 20 MIN: CPT | Performed by: INTERNAL MEDICINE

## 2024-07-10 RX ORDER — SEMAGLUTIDE 2.4 MG/.75ML
2.4 INJECTION, SOLUTION SUBCUTANEOUS WEEKLY
Qty: 3 ML | Refills: 3 | Status: SHIPPED | OUTPATIENT
Start: 2024-07-10

## 2024-07-10 RX ORDER — SEMAGLUTIDE 2.4 MG/.75ML
2.4 INJECTION, SOLUTION SUBCUTANEOUS WEEKLY
Qty: 3 ML | Refills: 4 | Status: SHIPPED | OUTPATIENT
Start: 2024-07-10 | End: 2024-07-10

## 2024-07-10 NOTE — PROGRESS NOTES
Assessment/Plan:  Akash was seen today for follow-up.    Diagnoses and all orders for this visit:    Obesity, Class II, BMI 35-39.9    Mixed hyperlipidemia    Prediabetes  -     Discontinue: Semaglutide-Weight Management (Wegovy) 2.4 MG/0.75ML; Inject 0.75 mL (2.4 mg total) under the skin once a week  -     Semaglutide-Weight Management (Wegovy) 2.4 MG/0.75ML; Inject 0.75 mL (2.4 mg total) under the skin once a week    Gastroesophageal reflux disease without esophagitis    Moderate persistent asthma without complication    Obesity, Class I, BMI 30-34.9  -     Discontinue: Semaglutide-Weight Management (Wegovy) 2.4 MG/0.75ML; Inject 0.75 mL (2.4 mg total) under the skin once a week  -     Semaglutide-Weight Management (Wegovy) 2.4 MG/0.75ML; Inject 0.75 mL (2.4 mg total) under the skin once a week       Initial weight: 252  Last OV weight: 252  Current weight: 223  Change in weight: -29 lb (11.5% TBW)  Goal weight 196 lbs    - Weight not at goal  - Patient is interested in Conservative Program  - Labs reviewed: As below.    General Recommendations:  Nutrition:  Eat breakfast daily.  Do not skip meals.     Food log (ie.) www.Synlogic.com, sparkpeople.com, loseit.com, Meusonic.com, etc.    Practice mindful eating.  Be sure to set aside time to eat, eat slowly, and savor your food.    Hydration:    At least 64oz of water daily.  No sugar sweetened beverages.  No juice (eat the fruit instead).    Exercise:  Studies have shown that the ideal exercise goal is somewhere between 150 to 300 minutes of moderate intensity exercise a week.  Start with exercising 10 minutes every other day and gradually increase physical activity with a goal of at least 150 minutes of moderate intensity exercise a week, divided over at least 3 days a week.  An example of this would be exercising 30 minutes a day, 5 days a week.  Resistance training can increase muscle mass and increase our resting metabolic rate.   FULL BODY resistance  training is recommended 2-3 times a week.  Do not do this on consecutive days to allow for muscle recovery.    Aim for a bare minimum 5000 steps, even on days you do not exercise.    Monitoring:   Weigh yourself daily.  If this causes undue stress, then just weigh yourself once a week.  Weigh yourself the same time of the day with the same amount of clothing on.  Preferably this should be done after waking up, before you eat, and with no clothing or minimal clothing on.    Specific Goals:    Have labs done as were ordere by Dr. Weller.    No sugary beverages. At least 64oz of water daily.    Gradually increase physical activity to a goal of 5 days per week for 30 minutes of MODERATE intensity PLUS 2 days per week of FULL BODY resistance training      Total time spent reviewing chart, interviewing patient, examining patient, discussing plan, answering all questions, and documentin min.       ______________________________________________________________________    Subjective:   Chief Complaint   Patient presents with    Follow-up     MWM- 5 mo F/u; Waist 41.5in     Patient here to discuss weight associated problems and nutrition goals  HPI: Akash Moore  is a 48 y.o. male with history of prediabetes, hyperlipidemia, GERD, asthma, and excess weight.  Weight loss plan:  Conservative Program.   Most recent notes and records were reviewed.    Wt Readings from Last 10 Encounters:   07/10/24 101 kg (223 lb 6.4 oz)   04/15/24 109 kg (240 lb 9.6 oz)   02/15/24 114 kg (251 lb 9.6 oz)   24 117 kg (259 lb)   23 115 kg (254 lb)   23 11.5 kg (25 lb 6.4 oz)   23 116 kg (255 lb)   23 115 kg (254 lb)   23 116 kg (255 lb 12.8 oz)   22 111 kg (245 lb)     Patient establish care with Melonie Alfaro on 2/15/2024.  Informed decision was made to start Wegovy.  He has tolerated this medication well and it has been titrated up to the current dose of 2.4 mg weekly.  Initial weight: 252  Last OV  "weight: 252  Current weight: 223  Change in weight: -29 lb (11.5% TBW)  Goal weight 196 lbs      Hunger/Cravings:  Significantly reduced   Dining out:  \"very little\" (once a week)  Hydration: Water intake 48 oz a day.  Alcohol:  No \"Very very little\"  Exercise:  1 hour a way of walking, jogging, or bicycle.  Sleep:  6-7  Weight Monitoring: On occasion      Patient denies personal and family history of  pancreatitis, thyroid cancer, MEN-2 tumors.  Denies any hx of glaucoma, seizures, kidney stones, gallstones.  Denies Hx of CAD, PAD, palpitations, arrhythmia.   Denies uncontrolled anxiety or depression, suicidal ideation or behavior, insomnia or sleep disturbance.     The following portions of the patient's history were reviewed and updated as appropriate: allergies, current medications, past family history, past medical history, past social history, past surgical history, and problem list.    Review Of Systems:  Review of Systems   Constitutional:  Negative for activity change, appetite change, chills, fatigue and fever.   HENT:  Negative for trouble swallowing.    Respiratory:  Negative for cough and shortness of breath.    Cardiovascular:  Negative for chest pain, palpitations and leg swelling.   Gastrointestinal:  Negative for abdominal pain, constipation, diarrhea, nausea and vomiting.   Endocrine: Negative for cold intolerance and heat intolerance.   Genitourinary:  Negative for difficulty urinating and dysuria.   Musculoskeletal:  Negative for arthralgias, back pain, gait problem and myalgias.   Skin:  Negative for pallor and rash.   Neurological:  Negative for headaches.   Psychiatric/Behavioral:  Negative for dysphoric mood and suicidal ideas. The patient is not nervous/anxious.        Objective:  /80   Pulse 77   Temp (!) 97.1 °F (36.2 °C)   Resp 16   Ht 5' 10\" (1.778 m)   Wt 101 kg (223 lb 6.4 oz)   BMI 32.05 kg/m²   Physical Exam  Vitals and nursing note reviewed.   Constitutional:       " "General: He is not in acute distress.     Appearance: Normal appearance. He is not ill-appearing or diaphoretic.   Eyes:      General: No scleral icterus.  Cardiovascular:      Rate and Rhythm: Normal rate and regular rhythm.      Pulses: Normal pulses.      Heart sounds: Normal heart sounds. No murmur heard.  Pulmonary:      Effort: Pulmonary effort is normal. No respiratory distress.      Breath sounds: Normal breath sounds. No stridor. No wheezing or rhonchi.   Abdominal:      General: Bowel sounds are normal.   Musculoskeletal:      Cervical back: Neck supple.      Right lower leg: No edema.      Left lower leg: No edema.   Lymphadenopathy:      Cervical: No cervical adenopathy.   Skin:     Capillary Refill: Capillary refill takes less than 2 seconds.      Findings: No lesion or rash.   Neurological:      Mental Status: He is alert and oriented to person, place, and time.      Gait: Gait normal.   Psychiatric:         Mood and Affect: Mood normal.         Behavior: Behavior normal.       Labs and Imaging  Recent labs and imaging have been personally reviewed.  Lab Results   Component Value Date    WBC 8.69 03/26/2023    HGB 14.5 03/26/2023    HCT 43.5 03/26/2023    MCV 87 03/26/2023     03/26/2023     Lab Results   Component Value Date    SODIUM 140 03/26/2023    K 4.1 03/26/2023     03/26/2023    CO2 27 03/26/2023    AGAP 6 03/26/2023    BUN 19 03/26/2023    CREATININE 0.96 03/26/2023    GLUC 136 03/26/2023    GLUF 89 01/09/2020    CALCIUM 9.0 03/26/2023    AST 22 01/09/2020    ALT 53 01/09/2020    ALKPHOS 114 01/09/2020    TP 7.7 01/09/2020    TBILI 0.63 01/09/2020    EGFR 92 03/26/2023     Lab Results   Component Value Date    HGBA1C 6.3 (H) 08/10/2023     No results found for: \"[id number]\", \"TSH\"  Lab Results   Component Value Date    CHOLESTEROL 222 (H) 08/10/2023     Lab Results   Component Value Date    HDL 38 (L) 08/10/2023     Lab Results   Component Value Date    TRIG 128 08/10/2023 "     Lab Results   Component Value Date    LDLCALC 158 (H) 08/10/2023

## 2024-07-10 NOTE — PATIENT INSTRUCTIONS
Initial weight: 252  Last OV weight: 252  Current weight: 223  Change in weight: -29 lb (11.5% TBW)  Goal weight 196 lbs    - Weight not at goal  - Patient is interested in Conservative Program  - Labs reviewed: As below.    General Recommendations:  Nutrition:  Eat breakfast daily.  Do not skip meals.     Food log (ie.) www.tadoÂ°.com, sparkpeople.com, loseit.com, calorieking.com, etc.    Practice mindful eating.  Be sure to set aside time to eat, eat slowly, and savor your food.    Hydration:    At least 64oz of water daily.  No sugar sweetened beverages.  No juice (eat the fruit instead).    Exercise:  Studies have shown that the ideal exercise goal is somewhere between 150 to 300 minutes of moderate intensity exercise a week.  Start with exercising 10 minutes every other day and gradually increase physical activity with a goal of at least 150 minutes of moderate intensity exercise a week, divided over at least 3 days a week.  An example of this would be exercising 30 minutes a day, 5 days a week.  Resistance training can increase muscle mass and increase our resting metabolic rate.   FULL BODY resistance training is recommended 2-3 times a week.  Do not do this on consecutive days to allow for muscle recovery.    Aim for a bare minimum 5000 steps, even on days you do not exercise.    Monitoring:   Weigh yourself daily.  If this causes undue stress, then just weigh yourself once a week.  Weigh yourself the same time of the day with the same amount of clothing on.  Preferably this should be done after waking up, before you eat, and with no clothing or minimal clothing on.    Specific Goals:    Have labs done as were ordere by Dr. Weller.    No sugary beverages. At least 64oz of water daily.    Gradually increase physical activity to a goal of 5 days per week for 30 minutes of MODERATE intensity PLUS 2 days per week of FULL BODY resistance training

## 2024-08-13 DIAGNOSIS — R73.03 PREDIABETES: ICD-10-CM

## 2024-08-13 DIAGNOSIS — E66.9 OBESITY, CLASS I, BMI 30-34.9: ICD-10-CM

## 2024-08-14 RX ORDER — SEMAGLUTIDE 2.4 MG/.75ML
2.4 INJECTION, SOLUTION SUBCUTANEOUS WEEKLY
Qty: 3 ML | Refills: 1 | Status: SHIPPED | OUTPATIENT
Start: 2024-08-14 | End: 2024-10-11

## 2024-08-28 ENCOUNTER — TELEPHONE (OUTPATIENT)
Dept: FAMILY MEDICINE CLINIC | Facility: CLINIC | Age: 49
End: 2024-08-28

## 2024-08-28 DIAGNOSIS — Z12.11 COLON CANCER SCREENING: Primary | ICD-10-CM

## 2024-08-30 ENCOUNTER — APPOINTMENT (OUTPATIENT)
Dept: LAB | Facility: HOSPITAL | Age: 49
End: 2024-08-30
Payer: COMMERCIAL

## 2024-08-30 DIAGNOSIS — Z00.8 HEALTH EXAMINATION IN POPULATION SURVEY: ICD-10-CM

## 2024-08-30 DIAGNOSIS — E66.01 CLASS 2 SEVERE OBESITY DUE TO EXCESS CALORIES WITH SERIOUS COMORBIDITY AND BODY MASS INDEX (BMI) OF 36.0 TO 36.9 IN ADULT (HCC): ICD-10-CM

## 2024-08-30 DIAGNOSIS — E78.2 MIXED HYPERLIPIDEMIA: ICD-10-CM

## 2024-08-30 LAB
ALBUMIN SERPL BCG-MCNC: 4.2 G/DL (ref 3.5–5)
ALP SERPL-CCNC: 96 U/L (ref 34–104)
ALT SERPL W P-5'-P-CCNC: 17 U/L (ref 7–52)
ANION GAP SERPL CALCULATED.3IONS-SCNC: 6 MMOL/L (ref 4–13)
AST SERPL W P-5'-P-CCNC: 15 U/L (ref 13–39)
BASOPHILS # BLD AUTO: 0.06 THOUSANDS/ÂΜL (ref 0–0.1)
BASOPHILS NFR BLD AUTO: 1 % (ref 0–1)
BILIRUB SERPL-MCNC: 0.48 MG/DL (ref 0.2–1)
BUN SERPL-MCNC: 15 MG/DL (ref 5–25)
CALCIUM SERPL-MCNC: 9.3 MG/DL (ref 8.4–10.2)
CHLORIDE SERPL-SCNC: 105 MMOL/L (ref 96–108)
CHOLEST SERPL-MCNC: 174 MG/DL
CO2 SERPL-SCNC: 28 MMOL/L (ref 21–32)
CREAT SERPL-MCNC: 1.09 MG/DL (ref 0.6–1.3)
EOSINOPHIL # BLD AUTO: 0.31 THOUSAND/ÂΜL (ref 0–0.61)
EOSINOPHIL NFR BLD AUTO: 3 % (ref 0–6)
ERYTHROCYTE [DISTWIDTH] IN BLOOD BY AUTOMATED COUNT: 12.9 % (ref 11.6–15.1)
EST. AVERAGE GLUCOSE BLD GHB EST-MCNC: 105 MG/DL
GFR SERPL CREATININE-BSD FRML MDRD: 78 ML/MIN/1.73SQ M
GLUCOSE P FAST SERPL-MCNC: 85 MG/DL (ref 65–99)
HBA1C MFR BLD: 5.3 %
HCT VFR BLD AUTO: 47.2 % (ref 36.5–49.3)
HDLC SERPL-MCNC: 45 MG/DL
HGB BLD-MCNC: 15.5 G/DL (ref 12–17)
IMM GRANULOCYTES # BLD AUTO: 0.02 THOUSAND/UL (ref 0–0.2)
IMM GRANULOCYTES NFR BLD AUTO: 0 % (ref 0–2)
LDLC SERPL CALC-MCNC: 110 MG/DL (ref 0–100)
LYMPHOCYTES # BLD AUTO: 3.09 THOUSANDS/ÂΜL (ref 0.6–4.47)
LYMPHOCYTES NFR BLD AUTO: 34 % (ref 14–44)
MCH RBC QN AUTO: 28.4 PG (ref 26.8–34.3)
MCHC RBC AUTO-ENTMCNC: 32.8 G/DL (ref 31.4–37.4)
MCV RBC AUTO: 86 FL (ref 82–98)
MONOCYTES # BLD AUTO: 0.5 THOUSAND/ÂΜL (ref 0.17–1.22)
MONOCYTES NFR BLD AUTO: 6 % (ref 4–12)
NEUTROPHILS # BLD AUTO: 5.08 THOUSANDS/ÂΜL (ref 1.85–7.62)
NEUTS SEG NFR BLD AUTO: 56 % (ref 43–75)
NONHDLC SERPL-MCNC: 129 MG/DL
NRBC BLD AUTO-RTO: 0 /100 WBCS
PLATELET # BLD AUTO: 239 THOUSANDS/UL (ref 149–390)
PMV BLD AUTO: 10.6 FL (ref 8.9–12.7)
POTASSIUM SERPL-SCNC: 4 MMOL/L (ref 3.5–5.3)
PROT SERPL-MCNC: 7.7 G/DL (ref 6.4–8.4)
RBC # BLD AUTO: 5.46 MILLION/UL (ref 3.88–5.62)
SODIUM SERPL-SCNC: 139 MMOL/L (ref 135–147)
TRIGL SERPL-MCNC: 95 MG/DL
TSH SERPL DL<=0.05 MIU/L-ACNC: 1.2 UIU/ML (ref 0.45–4.5)
WBC # BLD AUTO: 9.06 THOUSAND/UL (ref 4.31–10.16)

## 2024-08-30 PROCEDURE — 80061 LIPID PANEL: CPT

## 2024-08-30 PROCEDURE — 36415 COLL VENOUS BLD VENIPUNCTURE: CPT

## 2024-08-30 PROCEDURE — 83036 HEMOGLOBIN GLYCOSYLATED A1C: CPT

## 2024-08-30 PROCEDURE — 85025 COMPLETE CBC W/AUTO DIFF WBC: CPT

## 2024-08-30 PROCEDURE — 80053 COMPREHEN METABOLIC PANEL: CPT

## 2024-08-30 PROCEDURE — 84443 ASSAY THYROID STIM HORMONE: CPT

## 2024-09-27 LAB — COLOGUARD RESULT REPORTABLE: NEGATIVE

## 2024-11-19 ENCOUNTER — IMMUNIZATIONS (OUTPATIENT)
Dept: FAMILY MEDICINE CLINIC | Facility: CLINIC | Age: 49
End: 2024-11-19
Payer: COMMERCIAL

## 2024-11-19 DIAGNOSIS — Z23 NEEDS FLU SHOT: Primary | ICD-10-CM

## 2024-11-19 PROCEDURE — 90471 IMMUNIZATION ADMIN: CPT

## 2024-11-19 PROCEDURE — 90673 RIV3 VACCINE NO PRESERV IM: CPT

## 2024-12-27 DIAGNOSIS — E66.811 OBESITY, CLASS I, BMI 30-34.9: Primary | ICD-10-CM

## 2025-01-12 NOTE — PROGRESS NOTES
Assessment & Plan  Obesity, Class I, BMI 30-34.9   Initial weight: 252   Current weight: 209  TBW loss%: 17%    Continue with Wegovy 2.4 and patient is doing well by practicing mindful eating.  He stresses importance of adding protein to every meal and understands the role and metabolism and satiety    Recommend practicing mindful eating and drinking with frequent meals/snacks as it can potentially improve metabolism and allow for better portion control by decreasing Ghrelin (hunger hormone).      Recommend to time your carb consumption: Minimizing meals high in complex carbs close to bedtime, as they can interfere with sleep as well as potentially negatively impact your metabolism and promote weight gain. Consistently with protein intake throughout the day can help with satiety, muscle repair/growth and improve metabolism     Recommend adequate hydration which is at least half your body weight in ounces to help control cravings (decreasing confusion for appetite vs water deprivation) and to promote nutritional support as the human body is made of 50-65% of water.        Return in about 3 months (around 2025) for followup .     Total time spent reviewing chart, interviewing patient, examining patient, discussing plan, answering all questions, and documentin min. Most recent notes and records were reviewed.       ______________________________________________________________________        Subjective:     Chief Complaint   Patient presents with    Follow-up     MWM F/u; Waist 39in       HPI: Akash Moore  is a 49 y.o. male with past medical history past medical history of class I obesity, asthma, GERD, prediabetes presents to the clinic for follow-up.    Currently on Wegovy 2.4    Dietary Regimen:  Breakfast: Oatmeal, eggs           Lunch: salad, protein   Dinner: Smoothie protein   Beverages:  3-4 bottles (16oz), 2 cups     Physical Activity Intake:  Frequency: 3 times a week and cardio /strength  "training     Occupation: Environmental  services     Review Of Systems:  Constitutional:  Negative for diaphoresis.   Gastrointestinal:  Negative for abdominal pain, and vomiting.   Skin:  Negative for pallor.   Psychiatric/Behavioral:  Negative for behavioral problems, confusion, dysphoric mood and hallucinations.    All other systems reviewed and are negative.     Objective:  /80 (BP Location: Left arm, Patient Position: Sitting)   Pulse 74   Temp 98.2 °F (36.8 °C) (Tympanic)   Resp 16   Ht 5' 10\" (1.778 m)   Wt 95.1 kg (209 lb 9.6 oz)   BMI 30.07 kg/m²     Wt Readings from Last 20 Encounters:   01/13/25 95.1 kg (209 lb 9.6 oz)   07/10/24 101 kg (223 lb 6.4 oz)   04/15/24 109 kg (240 lb 9.6 oz)   02/15/24 114 kg (251 lb 9.6 oz)   01/31/24 117 kg (259 lb)   07/26/23 115 kg (254 lb)   03/26/23 11.5 kg (25 lb 6.4 oz)   01/31/23 116 kg (255 lb)   01/28/23 115 kg (254 lb)   01/05/23 116 kg (255 lb 12.8 oz)   08/17/22 111 kg (245 lb)   05/25/21 102 kg (225 lb)   02/11/21 107 kg (235 lb 14.3 oz)   11/24/20 109 kg (241 lb)   01/14/20 106 kg (233 lb)   01/09/20 105 kg (232 lb)   11/20/19 104 kg (229 lb)   07/02/19 104 kg (229 lb)   03/28/19 107 kg (235 lb 3.2 oz)   02/05/19 107 kg (236 lb)       Physical Exam  Constitutional:       General: No acute distress.  Well-nourished  HENT:      Head: Normocephalic and atraumatic.   Eyes:      Extraocular Movements: Extraocular movements intact.      Conjunctiva/sclera: Conjunctivae normal.      Pupils: Pupils are equal, round, and reactive to light.   Cardiovascular:      Rate and Rhythm: Normal rate.   Pulmonary:      Effort: Pulmonary effort is normal.   Neurological:      General: No focal deficit present.  AO x 3     Mental Status: Alert and oriented to person, place, and time. Mental status is at baseline.   Psychiatric:         Mood and Affect: Mood normal.         Behavior: Behavior normal.     Labs and Imaging  Recent labs and imaging have been personally " reviewed.    Lab Results   Component Value Date    WBC 9.06 08/30/2024    HGB 15.5 08/30/2024    HCT 47.2 08/30/2024    MCV 86 08/30/2024     08/30/2024       Lab Results   Component Value Date    SODIUM 139 08/30/2024    K 4.0 08/30/2024     08/30/2024    CO2 28 08/30/2024    AGAP 6 08/30/2024    BUN 15 08/30/2024    CREATININE 1.09 08/30/2024    GLUC 136 03/26/2023    GLUF 85 08/30/2024    CALCIUM 9.3 08/30/2024    AST 15 08/30/2024    ALT 17 08/30/2024    ALKPHOS 96 08/30/2024    TP 7.7 08/30/2024    TBILI 0.48 08/30/2024    EGFR 78 08/30/2024       Lab Results   Component Value Date    HGBA1C 5.3 08/30/2024       Lab Results   Component Value Date    UMN1RAKAGBJY 1.198 08/30/2024       Lab Results   Component Value Date    CHOLESTEROL 174 08/30/2024       Lab Results   Component Value Date    HDL 45 08/30/2024       Lab Results   Component Value Date    TRIG 95 08/30/2024       Lab Results   Component Value Date    LDLCALC 110 (H) 08/30/2024

## 2025-01-13 ENCOUNTER — OFFICE VISIT (OUTPATIENT)
Dept: BARIATRICS | Facility: CLINIC | Age: 50
End: 2025-01-13
Payer: COMMERCIAL

## 2025-01-13 VITALS
WEIGHT: 209.6 LBS | HEIGHT: 70 IN | RESPIRATION RATE: 16 BRPM | HEART RATE: 74 BPM | DIASTOLIC BLOOD PRESSURE: 80 MMHG | SYSTOLIC BLOOD PRESSURE: 120 MMHG | BODY MASS INDEX: 30.01 KG/M2 | TEMPERATURE: 98.2 F

## 2025-01-13 DIAGNOSIS — E66.811 OBESITY, CLASS I, BMI 30-34.9: Primary | ICD-10-CM

## 2025-01-13 DIAGNOSIS — E78.5 HYPERLIPIDEMIA, UNSPECIFIED HYPERLIPIDEMIA TYPE: ICD-10-CM

## 2025-01-13 PROCEDURE — 99213 OFFICE O/P EST LOW 20 MIN: CPT | Performed by: STUDENT IN AN ORGANIZED HEALTH CARE EDUCATION/TRAINING PROGRAM

## 2025-04-13 NOTE — PROGRESS NOTES
Assessment & Plan  Class 1 obesity    Initial weight: 252  Current weight:221     TBW loss%:12.3    Patient has been off Wegovy for the past 2 months as he was sick.  States he will trial without medications and informed him that we can prescribe Zepbound if unsuccessful    Patient understands the importance of making lifestyle changes as recommended below to aid in weight loss.      Dietary Recommendations:  Recommend to avoid skipping any meals. Adequate amount of Macronutrients (minimal 3 meals a day) is necessary to help improve metabolism, satiety and allow for better portion control by decreasing Ghrelin (hunger hormone). Lack of hunger can be suppressed by a hormone called Leptin (full hormone) which can occur from a previous meal or caffeine intake    Protein intake throughout the day can help promote satiety and is necessary for muscle growth/repair    Carbohydrates are essential as it is the vital source of fuel for daily activities. energy, cell function, nutrient absorption, and hormone production.     Fats: Essential vitamins like A, D, and E, support cell growth, function and are necessary for nutrient absorption to support your organs     Fluid intake which is at least half your body weight in ounces is necessary to help control cravings (decreasing confusion for appetite vs water deprivation) as the human body is made up of 50-70% of Fluids. If there is a diversion for water alone, would recommend flavored water (example-splash of lemonade or ice tea) to help promote compliance. Fluids include Teas, water, flavored water, seltzer water, coffee, shakes    Metabolism:    Metabolism can also be promoted by macronutrient intake and increased muscle weight via thermogenesis      Daily Calorie Needs: Recommend to take into account any fluid losses and calories burned via increased activity levels as daily calories may need to be adjusted     Weight check: Weights can fluctuate depending on fluid shifts  "vs what foods are consumed prior to checking your weight          Return in about 2 months (around 6/18/2025) for followup .     Most recent notes, labs and previous medical records were reviewed. Total time with chart review and with the patient: 35 min      ______________________________________________________________________        Subjective:     Chief Complaint   Patient presents with    Follow-up     MWM  3 month F/U , waist  41 inch .        HPI: 49 y.o. male with pmh of class I obesity, asthma, GERD, prediabetes  presents for follow-up    Wt Loss History:   Previously on Wegovy 2.4, which she stopped about 2 months ago because he was not feeling well.  Also heard about bad side effects from other sources and did not want to restart. Gained about 10-15 pounds       Dietary Regimen:  3am: Protein shakes            8am: Breakfast: Oatmeal, eggs  Lunch: salad, protein   3pm: Dinner: Smoothie protein   Beverages: 6-8 16 oz godfrey       Lifestyle hx:  Frequency: 3 times a week and cardio /strength training     Occupation: Environmental  services     Review Of Systems:  General: No pallor, no weakness   Pulmonary: Negative for shortness of breath  Chest: negative for chest pain  Gastrointestinal:  Negative for abdominal pain, diarrhea   Psychiatric/Behavioral:  Negative for behavioral problems, confusion, dysphoric mood and hallucinations.    All other systems reviewed and are negative.     Objective:  /80 (Patient Position: Sitting, Cuff Size: Standard)   Pulse 72   Temp (!) 96.5 °F (35.8 °C) (Tympanic)   Ht 5' 10\" (1.778 m)   Wt 100 kg (221 lb 6.4 oz)   BMI 31.77 kg/m²     Wt Readings from Last 30 Encounters:   04/14/25 100 kg (221 lb 6.4 oz)   01/13/25 95.1 kg (209 lb 9.6 oz)   07/10/24 101 kg (223 lb 6.4 oz)   04/15/24 109 kg (240 lb 9.6 oz)   02/15/24 114 kg (251 lb 9.6 oz)   01/31/24 117 kg (259 lb)   07/26/23 115 kg (254 lb)   03/26/23 11.5 kg (25 lb 6.4 oz)   01/31/23 116 kg (255 lb)   01/28/23 " 115 kg (254 lb)   01/05/23 116 kg (255 lb 12.8 oz)   08/17/22 111 kg (245 lb)   05/25/21 102 kg (225 lb)   02/11/21 107 kg (235 lb 14.3 oz)   11/24/20 109 kg (241 lb)   01/14/20 106 kg (233 lb)   01/09/20 105 kg (232 lb)   11/20/19 104 kg (229 lb)   07/02/19 104 kg (229 lb)   03/28/19 107 kg (235 lb 3.2 oz)   02/05/19 107 kg (236 lb)   10/09/18 107 kg (235 lb)       Physical Exam  Constitutional:       General: No acute distress.  Well-nourished  HENT:      Head: Normocephalic and atraumatic.   Eyes:      Extraocular Movements: Extraocular movements intact.      Conjunctiva/pupils: Conjunctivae normal. Pupils are equal, round  Pulmonary:      Effort: Pulmonary effort is normal. No labored breathing   Neurological:      General: No focal deficit present.  AO x 3     Mental Status: Alert and oriented to person, place, and time. Mental status is at baseline.   Psychiatric:         Mood and Affect: Mood normal.         Behavior: Behavior normal.     Labs and Imaging  Recent labs and imaging have been personally reviewed.

## 2025-04-14 ENCOUNTER — OFFICE VISIT (OUTPATIENT)
Dept: BARIATRICS | Facility: CLINIC | Age: 50
End: 2025-04-14
Payer: COMMERCIAL

## 2025-04-14 VITALS
WEIGHT: 221.4 LBS | HEIGHT: 70 IN | BODY MASS INDEX: 31.7 KG/M2 | DIASTOLIC BLOOD PRESSURE: 80 MMHG | TEMPERATURE: 96.5 F | HEART RATE: 72 BPM | SYSTOLIC BLOOD PRESSURE: 128 MMHG

## 2025-04-14 DIAGNOSIS — E66.811 CLASS 1 OBESITY DUE TO EXCESS CALORIES WITH BODY MASS INDEX (BMI) OF 34.0 TO 34.9 IN ADULT: Primary | ICD-10-CM

## 2025-04-14 DIAGNOSIS — E66.09 CLASS 1 OBESITY DUE TO EXCESS CALORIES WITH BODY MASS INDEX (BMI) OF 34.0 TO 34.9 IN ADULT: Primary | ICD-10-CM

## 2025-04-14 PROCEDURE — 99214 OFFICE O/P EST MOD 30 MIN: CPT | Performed by: STUDENT IN AN ORGANIZED HEALTH CARE EDUCATION/TRAINING PROGRAM

## 2025-04-25 DIAGNOSIS — E66.811 OBESITY, CLASS I, BMI 30-34.9: Primary | ICD-10-CM

## 2025-04-25 RX ORDER — TIRZEPATIDE 2.5 MG/.5ML
2.5 INJECTION, SOLUTION SUBCUTANEOUS WEEKLY
Qty: 2 ML | Refills: 0 | Status: SHIPPED | OUTPATIENT
Start: 2025-04-25 | End: 2025-05-23

## 2025-05-16 ENCOUNTER — TELEPHONE (OUTPATIENT)
Dept: BARIATRICS | Facility: CLINIC | Age: 50
End: 2025-05-16

## 2025-05-16 NOTE — TELEPHONE ENCOUNTER
[facility]ep[facility]mg[facility]    via    []CMM-KEY: [id number]  []Surescripts-Case ID #   []Availity-Auth ID # NDC #   []Faxed to plan   []Other website   []Phone call Case ID #     []PA sent as URGENT    All office notes, labs and other pertaining documents and studies sent. Clinical questions answered. Awaiting determination from insurance company.     Turnaround time for your insurance to make a decision on your Prior Authorization can take 7-21 business days.

## 2025-05-21 NOTE — TELEPHONE ENCOUNTER
PA for zepbound 2.5mg APPROVED     Date(s) approved 05/16/2025-05/16/2026    Case #    Patient advised by          [x]MyChart Message  []Phone call   []LMOM  []L/M to call office as no active Communication consent on file  []Unable to leave detailed message as VM not approved on Communication consent       Pharmacy advised by    [x]Fax  []Phone call  []Secure Chat    Specialty Pharmacy    []     Approval letter scanned into Media Yes

## 2025-05-27 DIAGNOSIS — R73.03 PREDIABETES: ICD-10-CM

## 2025-05-27 DIAGNOSIS — J45.40 MODERATE PERSISTENT ASTHMA WITHOUT COMPLICATION: ICD-10-CM

## 2025-05-27 DIAGNOSIS — E66.09 CLASS 1 OBESITY DUE TO EXCESS CALORIES WITH BODY MASS INDEX (BMI) OF 34.0 TO 34.9 IN ADULT: Primary | ICD-10-CM

## 2025-05-27 DIAGNOSIS — E66.811 CLASS 1 OBESITY DUE TO EXCESS CALORIES WITH BODY MASS INDEX (BMI) OF 34.0 TO 34.9 IN ADULT: Primary | ICD-10-CM

## 2025-05-27 DIAGNOSIS — E78.5 HYPERLIPIDEMIA, UNSPECIFIED HYPERLIPIDEMIA TYPE: ICD-10-CM

## 2025-05-27 RX ORDER — TIRZEPATIDE 2.5 MG/.5ML
2.5 INJECTION, SOLUTION SUBCUTANEOUS WEEKLY
Qty: 2 ML | Refills: 0 | Status: SHIPPED | OUTPATIENT
Start: 2025-05-27 | End: 2025-06-24

## 2025-06-10 DIAGNOSIS — E66.09 CLASS 1 OBESITY DUE TO EXCESS CALORIES WITH BODY MASS INDEX (BMI) OF 34.0 TO 34.9 IN ADULT: ICD-10-CM

## 2025-06-10 DIAGNOSIS — E78.5 HYPERLIPIDEMIA, UNSPECIFIED HYPERLIPIDEMIA TYPE: ICD-10-CM

## 2025-06-10 DIAGNOSIS — E66.811 CLASS 1 OBESITY DUE TO EXCESS CALORIES WITH BODY MASS INDEX (BMI) OF 34.0 TO 34.9 IN ADULT: ICD-10-CM

## 2025-06-10 DIAGNOSIS — R73.03 PREDIABETES: Primary | ICD-10-CM

## 2025-06-10 RX ORDER — TIRZEPATIDE 5 MG/.5ML
5 INJECTION, SOLUTION SUBCUTANEOUS WEEKLY
Qty: 2 ML | Refills: 4 | Status: SHIPPED | OUTPATIENT
Start: 2025-06-10

## 2025-07-15 ENCOUNTER — OFFICE VISIT (OUTPATIENT)
Dept: BARIATRICS | Facility: CLINIC | Age: 50
End: 2025-07-15
Payer: COMMERCIAL

## 2025-07-15 VITALS
DIASTOLIC BLOOD PRESSURE: 84 MMHG | RESPIRATION RATE: 17 BRPM | HEIGHT: 70 IN | WEIGHT: 224.6 LBS | HEART RATE: 86 BPM | BODY MASS INDEX: 32.16 KG/M2 | SYSTOLIC BLOOD PRESSURE: 132 MMHG

## 2025-07-15 DIAGNOSIS — E66.09 CLASS 1 OBESITY DUE TO EXCESS CALORIES WITH BODY MASS INDEX (BMI) OF 34.0 TO 34.9 IN ADULT: Primary | ICD-10-CM

## 2025-07-15 DIAGNOSIS — E66.811 CLASS 1 OBESITY DUE TO EXCESS CALORIES WITH BODY MASS INDEX (BMI) OF 34.0 TO 34.9 IN ADULT: Primary | ICD-10-CM

## 2025-07-15 DIAGNOSIS — E78.5 HYPERLIPIDEMIA, UNSPECIFIED HYPERLIPIDEMIA TYPE: ICD-10-CM

## 2025-07-15 DIAGNOSIS — J45.40 MODERATE PERSISTENT ASTHMA WITHOUT COMPLICATION: ICD-10-CM

## 2025-07-15 DIAGNOSIS — R73.03 PREDIABETES: ICD-10-CM

## 2025-07-15 PROCEDURE — 99214 OFFICE O/P EST MOD 30 MIN: CPT | Performed by: STUDENT IN AN ORGANIZED HEALTH CARE EDUCATION/TRAINING PROGRAM

## 2025-07-15 RX ORDER — TIRZEPATIDE 7.5 MG/.5ML
7.5 INJECTION, SOLUTION SUBCUTANEOUS WEEKLY
Qty: 2 ML | Refills: 4 | Status: SHIPPED | OUTPATIENT
Start: 2025-07-15

## 2025-08-07 ENCOUNTER — OFFICE VISIT (OUTPATIENT)
Dept: FAMILY MEDICINE CLINIC | Facility: CLINIC | Age: 50
End: 2025-08-07
Payer: COMMERCIAL

## 2025-08-07 VITALS
OXYGEN SATURATION: 97 % | SYSTOLIC BLOOD PRESSURE: 118 MMHG | HEIGHT: 70 IN | TEMPERATURE: 98.8 F | WEIGHT: 224 LBS | DIASTOLIC BLOOD PRESSURE: 70 MMHG | HEART RATE: 77 BPM | RESPIRATION RATE: 18 BRPM | BODY MASS INDEX: 32.07 KG/M2

## 2025-08-07 DIAGNOSIS — E66.811 CLASS 1 OBESITY DUE TO EXCESS CALORIES WITH SERIOUS COMORBIDITY AND BODY MASS INDEX (BMI) OF 32.0 TO 32.9 IN ADULT: ICD-10-CM

## 2025-08-07 DIAGNOSIS — Z12.5 PROSTATE CANCER SCREENING: ICD-10-CM

## 2025-08-07 DIAGNOSIS — R42 DIZZINESS: ICD-10-CM

## 2025-08-07 DIAGNOSIS — E78.2 MIXED HYPERLIPIDEMIA: Primary | ICD-10-CM

## 2025-08-07 DIAGNOSIS — E66.09 CLASS 1 OBESITY DUE TO EXCESS CALORIES WITH SERIOUS COMORBIDITY AND BODY MASS INDEX (BMI) OF 32.0 TO 32.9 IN ADULT: ICD-10-CM

## 2025-08-07 PROBLEM — R73.9 HYPERGLYCEMIA: Status: ACTIVE | Noted: 2024-02-15

## 2025-08-07 PROCEDURE — 99214 OFFICE O/P EST MOD 30 MIN: CPT | Performed by: FAMILY MEDICINE

## 2025-08-07 PROCEDURE — 93000 ELECTROCARDIOGRAM COMPLETE: CPT | Performed by: FAMILY MEDICINE

## 2025-08-09 ENCOUNTER — APPOINTMENT (OUTPATIENT)
Dept: LAB | Facility: HOSPITAL | Age: 50
End: 2025-08-09

## 2025-08-09 ENCOUNTER — APPOINTMENT (OUTPATIENT)
Dept: LAB | Facility: HOSPITAL | Age: 50
End: 2025-08-09
Payer: COMMERCIAL

## 2025-08-09 LAB
CHOLEST SERPL-MCNC: 174 MG/DL (ref ?–200)
HDLC SERPL-MCNC: 44 MG/DL
LDLC SERPL CALC-MCNC: 110 MG/DL (ref 0–100)
NONHDLC SERPL-MCNC: 130 MG/DL
TRIGL SERPL-MCNC: 101 MG/DL (ref ?–150)